# Patient Record
Sex: MALE | Race: WHITE | NOT HISPANIC OR LATINO | Employment: OTHER | URBAN - METROPOLITAN AREA
[De-identification: names, ages, dates, MRNs, and addresses within clinical notes are randomized per-mention and may not be internally consistent; named-entity substitution may affect disease eponyms.]

---

## 2017-01-06 RX ORDER — TERAZOSIN 5 MG/1
CAPSULE ORAL
Qty: 180 CAPSULE | Refills: 1 | Status: SHIPPED | OUTPATIENT
Start: 2017-01-06 | End: 2017-05-27 | Stop reason: SDUPTHER

## 2017-01-17 RX ORDER — ALPRAZOLAM 0.5 MG/1
TABLET ORAL
Qty: 180 TABLET | Refills: 0 | Status: SHIPPED | OUTPATIENT
Start: 2017-01-17 | End: 2017-04-21 | Stop reason: SDUPTHER

## 2017-02-13 RX ORDER — SIMVASTATIN 80 MG
TABLET ORAL
Qty: 90 TABLET | Refills: 3 | Status: SHIPPED | OUTPATIENT
Start: 2017-02-13 | End: 2017-07-26 | Stop reason: SDUPTHER

## 2017-03-02 ENCOUNTER — TELEPHONE (OUTPATIENT)
Dept: FAMILY MEDICINE CLINIC | Facility: CLINIC | Age: 67
End: 2017-03-02

## 2017-03-02 RX ORDER — HYDROCODONE BITARTRATE AND ACETAMINOPHEN 10; 325 MG/1; MG/1
1 TABLET ORAL EVERY 4 HOURS PRN
Qty: 540 TABLET | Refills: 0 | Status: SHIPPED | OUTPATIENT
Start: 2017-03-02 | End: 2017-06-12 | Stop reason: SDUPTHER

## 2017-03-02 NOTE — TELEPHONE ENCOUNTER
RX IS UP FRONT AND READY TO BE PICKED UP. PT IS AWARE.     ----- Message from Ellis Carroll sent at 3/1/2017  3:04 PM EST -----  PT NEEDS SCRIPT REFILL FOR HYDROcodone-acetaminophen  MG TAKE 1 TABLET EVERY 4 HOURS AS NEEDED #027.    PLEASE CONTACT PT WHEN READY FOR  [phone]

## 2017-03-28 ENCOUNTER — TELEPHONE (OUTPATIENT)
Dept: FAMILY MEDICINE CLINIC | Facility: CLINIC | Age: 67
End: 2017-03-28

## 2017-03-28 RX ORDER — GABAPENTIN 800 MG/1
800 TABLET ORAL 4 TIMES DAILY
Qty: 360 TABLET | Refills: 0 | Status: SHIPPED | OUTPATIENT
Start: 2017-03-28 | End: 2017-04-12 | Stop reason: SDUPTHER

## 2017-03-28 NOTE — TELEPHONE ENCOUNTER
SENT RX TO [facility] FOR PT. FLO.     ----- Message from Clemencia Arevalo sent at 3/28/2017 12:17 PM EDT -----  NEEDS A REFILL  ON GABAPENTIN 800 MG  90 DAY SUPPLY     PHARMACY     [facility]     PLEASE CALL PT WITH ANY QUESTIONS     [phone]

## 2017-03-31 DIAGNOSIS — E78.5 HYPERLIPIDEMIA, UNSPECIFIED HYPERLIPIDEMIA TYPE: ICD-10-CM

## 2017-03-31 DIAGNOSIS — Z79.4 TYPE 2 DIABETES MELLITUS WITH COMPLICATION, WITH LONG-TERM CURRENT USE OF INSULIN (HCC): ICD-10-CM

## 2017-03-31 DIAGNOSIS — R79.89 ELEVATED SERUM FREE T4 LEVEL: Primary | ICD-10-CM

## 2017-03-31 DIAGNOSIS — Z11.59 NEED FOR HEPATITIS C SCREENING TEST: ICD-10-CM

## 2017-03-31 DIAGNOSIS — E11.8 TYPE 2 DIABETES MELLITUS WITH COMPLICATION, WITH LONG-TERM CURRENT USE OF INSULIN (HCC): ICD-10-CM

## 2017-04-03 ENCOUNTER — TELEPHONE (OUTPATIENT)
Dept: FAMILY MEDICINE CLINIC | Facility: CLINIC | Age: 67
End: 2017-04-03

## 2017-04-03 RX ORDER — DESOXIMETASONE 0.5 MG/G
CREAM TOPICAL 2 TIMES DAILY
Qty: 60 G | Refills: 1 | Status: SHIPPED | OUTPATIENT
Start: 2017-04-03 | End: 2017-07-05

## 2017-04-03 NOTE — TELEPHONE ENCOUNTER
SENT RX TO PHARMACY FOR PT.       ----- Message from Ellis Carroll sent at 4/3/2017 10:37 AM EDT -----  PT NEEDS SCRIPT REFILL FOR desoximetasone 0.25 % cream Apply  topically 2 (two) times a day #60g.  PLEASE SEND SCRIPT TO [facility] IN [place] ON [facility].  IF YOU HAVE ANY QUESTIONS PLEASE CONTACT PT [phone] OR ON HIS CELL [phone]

## 2017-04-09 LAB
ALBUMIN SERPL-MCNC: 4.3 G/DL (ref 3.5–5.2)
ALBUMIN/GLOB SERPL: 1.7 G/DL
ALP SERPL-CCNC: 81 U/L (ref 39–117)
ALT SERPL-CCNC: 19 U/L (ref 1–41)
AST SERPL-CCNC: 15 U/L (ref 1–40)
BASOPHILS # BLD AUTO: 0.02 10*3/MM3 (ref 0–0.2)
BASOPHILS NFR BLD AUTO: 0.3 % (ref 0–1.5)
BILIRUB SERPL-MCNC: 0.2 MG/DL (ref 0.1–1.2)
BUN SERPL-MCNC: 23 MG/DL (ref 8–23)
BUN/CREAT SERPL: 12.3 (ref 7–25)
CALCIUM SERPL-MCNC: 9.6 MG/DL (ref 8.6–10.5)
CHLORIDE SERPL-SCNC: 103 MMOL/L (ref 98–107)
CHOLEST SERPL-MCNC: 168 MG/DL (ref 100–199)
CO2 SERPL-SCNC: 25.1 MMOL/L (ref 22–29)
CREAT SERPL-MCNC: 1.87 MG/DL (ref 0.76–1.27)
EOSINOPHIL # BLD AUTO: 0.25 10*3/MM3 (ref 0–0.7)
EOSINOPHIL NFR BLD AUTO: 3.8 % (ref 0.3–6.2)
ERYTHROCYTE [DISTWIDTH] IN BLOOD BY AUTOMATED COUNT: 12.7 % (ref 11.5–14.5)
GLOBULIN SER CALC-MCNC: 2.5 GM/DL
GLUCOSE SERPL-MCNC: 105 MG/DL (ref 65–99)
HBA1C MFR BLD: 6.84 % (ref 4.8–5.6)
HCT VFR BLD AUTO: 40.5 % (ref 40.4–52.2)
HCV AB S/CO SERPL IA: <0.1 S/CO RATIO (ref 0–0.9)
HCV AB SERPL QL IA: NORMAL
HDL SERPL-SCNC: 38.4 UMOL/L
HDLC SERPL-MCNC: 64 MG/DL
HGB BLD-MCNC: 13.5 G/DL (ref 13.7–17.6)
IMM GRANULOCYTES # BLD: 0.03 10*3/MM3 (ref 0–0.03)
IMM GRANULOCYTES NFR BLD: 0.5 % (ref 0–0.5)
LDL SERPL QN: 21.1 NM
LDL SERPL-SCNC: 1124 NMOL/L
LDL SMALL SERPL-SCNC: 474 NMOL/L
LDLC SERPL CALC-MCNC: 83 MG/DL (ref 0–99)
LYMPHOCYTES # BLD AUTO: 1.39 10*3/MM3 (ref 0.9–4.8)
LYMPHOCYTES NFR BLD AUTO: 21 % (ref 19.6–45.3)
MCH RBC QN AUTO: 32.2 PG (ref 27–32.7)
MCHC RBC AUTO-ENTMCNC: 33.3 G/DL (ref 32.6–36.4)
MCV RBC AUTO: 96.7 FL (ref 79.8–96.2)
MICROALBUMIN UR-MCNC: 79 UG/ML
MONOCYTES # BLD AUTO: 0.91 10*3/MM3 (ref 0.2–1.2)
MONOCYTES NFR BLD AUTO: 13.7 % (ref 5–12)
NEUTROPHILS # BLD AUTO: 4.02 10*3/MM3 (ref 1.9–8.1)
NEUTROPHILS NFR BLD AUTO: 60.7 % (ref 42.7–76)
PLATELET # BLD AUTO: 220 10*3/MM3 (ref 140–500)
POTASSIUM SERPL-SCNC: 4.3 MMOL/L (ref 3.5–5.2)
PROT SERPL-MCNC: 6.8 G/DL (ref 6–8.5)
RBC # BLD AUTO: 4.19 10*6/MM3 (ref 4.6–6)
SODIUM SERPL-SCNC: 145 MMOL/L (ref 136–145)
T4 FREE SERPL-MCNC: 0.77 NG/DL (ref 0.93–1.7)
TRIGL SERPL-MCNC: 103 MG/DL (ref 0–149)
TSH SERPL DL<=0.005 MIU/L-ACNC: 1.81 MIU/ML (ref 0.27–4.2)
WBC # BLD AUTO: 6.62 10*3/MM3 (ref 4.5–10.7)

## 2017-04-12 RX ORDER — GABAPENTIN 800 MG/1
800 TABLET ORAL 4 TIMES DAILY
Qty: 360 TABLET | Refills: 0 | Status: SHIPPED | OUTPATIENT
Start: 2017-04-12 | End: 2017-07-31 | Stop reason: SDUPTHER

## 2017-04-21 RX ORDER — ALPRAZOLAM 0.5 MG/1
TABLET ORAL
Qty: 180 TABLET | Refills: 0 | Status: SHIPPED | OUTPATIENT
Start: 2017-04-21 | End: 2017-07-26 | Stop reason: SDUPTHER

## 2017-04-21 RX ORDER — INSULIN GLARGINE 100 [IU]/ML
INJECTION, SOLUTION SUBCUTANEOUS
Qty: 32 ML | Refills: 0 | Status: SHIPPED | OUTPATIENT
Start: 2017-04-21 | End: 2017-04-27 | Stop reason: SDUPTHER

## 2017-04-27 ENCOUNTER — OFFICE VISIT (OUTPATIENT)
Dept: ENDOCRINOLOGY | Age: 67
End: 2017-04-27

## 2017-04-27 VITALS
SYSTOLIC BLOOD PRESSURE: 144 MMHG | OXYGEN SATURATION: 94 % | WEIGHT: 243.12 LBS | HEIGHT: 74 IN | DIASTOLIC BLOOD PRESSURE: 56 MMHG | BODY MASS INDEX: 31.2 KG/M2 | HEART RATE: 69 BPM

## 2017-04-27 DIAGNOSIS — I10 ESSENTIAL HYPERTENSION: ICD-10-CM

## 2017-04-27 DIAGNOSIS — E11.8 TYPE 2 DIABETES MELLITUS WITH COMPLICATION, WITH LONG-TERM CURRENT USE OF INSULIN (HCC): Primary | ICD-10-CM

## 2017-04-27 DIAGNOSIS — Z79.4 TYPE 2 DIABETES MELLITUS WITH COMPLICATION, WITH LONG-TERM CURRENT USE OF INSULIN (HCC): Primary | ICD-10-CM

## 2017-04-27 DIAGNOSIS — E11.42 DIABETIC PERIPHERAL NEUROPATHY (HCC): ICD-10-CM

## 2017-04-27 DIAGNOSIS — Q87.89 BIRT-HOGG-DUBE SYNDROME: ICD-10-CM

## 2017-04-27 DIAGNOSIS — E78.5 HYPERLIPIDEMIA, UNSPECIFIED HYPERLIPIDEMIA TYPE: ICD-10-CM

## 2017-04-27 DIAGNOSIS — N13.9 LOWER OBSTRUCTIVE UROPATHY: ICD-10-CM

## 2017-04-27 PROCEDURE — 99214 OFFICE O/P EST MOD 30 MIN: CPT | Performed by: INTERNAL MEDICINE

## 2017-04-27 RX ORDER — INSULIN GLARGINE 100 [IU]/ML
INJECTION, SOLUTION SUBCUTANEOUS
Qty: 3 EACH | Refills: 2 | Status: SHIPPED | OUTPATIENT
Start: 2017-04-27 | End: 2017-06-21 | Stop reason: SDUPTHER

## 2017-04-27 NOTE — PROGRESS NOTES
Subjective   Apolinar Tavarez is a 67 y.o. male.     HPI Comments: F/u for dm2,hypertension,hyperlipidemia / testing bs 5-6 x day / last dm eye exam 9/1/16/ last dm foot exam today with dr Skinner     Diabetes   Hypoglycemia symptoms include nervousness/anxiousness ( anxiety ). Associated symptoms include chest pain.   Hypertension   Associated symptoms include chest pain, palpitations and shortness of breath.   Hyperlipidemia   Associated symptoms include chest pain and shortness of breath.      Patient has known diabetes mellitus since 1988 and started on insulin 1996. He has been on Lantus 35 units every evening and NovoLog 20-22 units with each meal. He has gained 4 pounds since Sept 2016.  Fasting blood sugar runs between 28211.  RBS . Hemoglobin A1c done in 4/17 was 6.84%.      He has peripheral neuropathy with numbness and tingling in his feet. He has been on gabapentin 800 mg 4 times a day with partial relief. She has never used Cymbalta or Lyrica in the past. He has diabetic retinopathy and had previous retinal laser surgery and intravitreal injection on both eyes. 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 He gets intraocular injections intermittently. His last eye examination was 1/17. He has chronic kidney disease and follows with Dr. Steele. He has microalbuminuria on urine sample taken last 12/16.      He has hypertension and has been on amlodipine 5 mg once a day, terazosin 5 mg BID and Lasix 20 mg/day. He denies any previous history of heart attack or stroke. He denies chest pain.      He has hyperlipidemia and has been on Zocor 80 mg once a day for at least 5 years. Lipid profile done in 4/17 are as follows:  Cholesterol 168.  HDL 38.  LDL 83.      He had a left partial nephrectomy for cancer by Dr. Carye last April 2016. There were no postoperative complications. He did not require chemotherapy or radiation therapy. He has history of radiation therapy after he had a right orchiectomy for testicular  "cancer. He developed urethral stricture but has not had dilation. He has history of enlarged prostate and has been on terazosin and Proscar 5 mg/day. He has urinary hesitancy and dribbling. He denies dysuria.      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 He has no new lung cysts. He is on Symbicort.    The following portions of the patient's history were reviewed and updated as appropriate: allergies, current medications, past family history, past medical history, past social history, past surgical history and problem list.    Review of Systems   HENT: Negative.    Eyes: Positive for visual disturbance.   Respiratory: Positive for chest tightness and shortness of breath.    Cardiovascular: Positive for chest pain, palpitations and leg swelling.   Gastrointestinal: Negative.    Endocrine: Negative.    Genitourinary: Positive for difficulty urinating ( slow stream ) and urgency.   Musculoskeletal: Positive for back pain.   Skin: Negative.    Allergic/Immunologic: Negative.    Neurological: Positive for numbness. Light-headedness: legs and feet    Hematological: Negative.    Psychiatric/Behavioral: Positive for sleep disturbance ( sleep apnea on c pap machine ). The patient is nervous/anxious ( anxiety ).        Objective      Vitals:    04/27/17 1359   BP: 144/56   BP Location: Right arm   Patient Position: Sitting   Cuff Size: Large Adult   Pulse: 69   SpO2: 94%   Weight: 243 lb 1.9 oz (110 kg)   Height: 74\" (188 cm)     Physical Exam   Constitutional: He is oriented to person, place, and time. He appears well-developed and well-nourished. No distress.   HENT:   Head: Normocephalic.   Nose: Nose normal.   Mouth/Throat: No oropharyngeal exudate.   Eyes: Conjunctivae and EOM are normal. Right eye exhibits no discharge. Left eye exhibits no discharge. No scleral icterus.   Neck: Normal range of motion. Neck supple. No JVD present. No tracheal deviation present. No thyromegaly present. "   Cardiovascular: Normal rate, regular rhythm, normal heart sounds and intact distal pulses.  Exam reveals no gallop and no friction rub.    No murmur heard.  Pulmonary/Chest: Effort normal and breath sounds normal. No respiratory distress. He has no wheezes. He has no rales.   Abdominal: Soft. Bowel sounds are normal. He exhibits no distension and no mass. There is no tenderness.   Musculoskeletal: Normal range of motion. He exhibits no edema or deformity.   No plantar ulcers.  Calluses on  both big toes, plantar surface   Lymphadenopathy:     He has no cervical adenopathy.   Neurological: He is alert and oriented to person, place, and time. He has normal reflexes. He displays normal reflexes. Coordination normal.   Intact light touch and vibration on both upper and lower extremities.   Skin: Skin is warm. No rash noted. No erythema. No pallor.   Psychiatric: He has a normal mood and affect. His behavior is normal. Thought content normal.     Orders Only on 03/31/2017   Component Date Value Ref Range Status   • WBC 04/07/2017 6.62  4.50 - 10.70 10*3/mm3 Final   • RBC 04/07/2017 4.19* 4.60 - 6.00 10*6/mm3 Final   • Hemoglobin 04/07/2017 13.5* 13.7 - 17.6 g/dL Final   • Hematocrit 04/07/2017 40.5  40.4 - 52.2 % Final   • MCV 04/07/2017 96.7* 79.8 - 96.2 fL Final   • MCH 04/07/2017 32.2  27.0 - 32.7 pg Final   • MCHC 04/07/2017 33.3  32.6 - 36.4 g/dL Final   • RDW 04/07/2017 12.7  11.5 - 14.5 % Final   • Platelets 04/07/2017 220  140 - 500 10*3/mm3 Final   • Neutrophil Rel % 04/07/2017 60.7  42.7 - 76.0 % Final   • Lymphocyte Rel % 04/07/2017 21.0  19.6 - 45.3 % Final   • Monocyte Rel % 04/07/2017 13.7* 5.0 - 12.0 % Final   • Eosinophil Rel % 04/07/2017 3.8  0.3 - 6.2 % Final   • Basophil Rel % 04/07/2017 0.3  0.0 - 1.5 % Final   • Neutrophils Absolute 04/07/2017 4.02  1.90 - 8.10 10*3/mm3 Final   • Lymphocytes Absolute 04/07/2017 1.39  0.90 - 4.80 10*3/mm3 Final   • Monocytes Absolute 04/07/2017 0.91  0.20 - 1.20  10*3/mm3 Final   • Eosinophils Absolute 04/07/2017 0.25  0.00 - 0.70 10*3/mm3 Final   • Basophils Absolute 04/07/2017 0.02  0.00 - 0.20 10*3/mm3 Final   • Immature Granulocyte Rel % 04/07/2017 0.5  0.0 - 0.5 % Final   • Immature Grans Absolute 04/07/2017 0.03  0.00 - 0.03 10*3/mm3 Final   • Glucose 04/07/2017 105* 65 - 99 mg/dL Final   • BUN 04/07/2017 23  8 - 23 mg/dL Final   • Creatinine 04/07/2017 1.87* 0.76 - 1.27 mg/dL Final   • eGFR Non African Am 04/07/2017 36* >60 mL/min/1.73 Final   • eGFR African Am 04/07/2017 44* >60 mL/min/1.73 Final   • BUN/Creatinine Ratio 04/07/2017 12.3  7.0 - 25.0 Final   • Sodium 04/07/2017 145  136 - 145 mmol/L Final   • Potassium 04/07/2017 4.3  3.5 - 5.2 mmol/L Final   • Chloride 04/07/2017 103  98 - 107 mmol/L Final   • Total CO2 04/07/2017 25.1  22.0 - 29.0 mmol/L Final   • Calcium 04/07/2017 9.6  8.6 - 10.5 mg/dL Final   • Total Protein 04/07/2017 6.8  6.0 - 8.5 g/dL Final   • Albumin 04/07/2017 4.30  3.50 - 5.20 g/dL Final   • Globulin 04/07/2017 2.5  gm/dL Final   • A/G Ratio 04/07/2017 1.7  g/dL Final   • Total Bilirubin 04/07/2017 0.2  0.1 - 1.2 mg/dL Final   • Alkaline Phosphatase 04/07/2017 81  39 - 117 U/L Final   • AST (SGOT) 04/07/2017 15  1 - 40 U/L Final   • ALT (SGPT) 04/07/2017 19  1 - 41 U/L Final   • LDL-P 04/07/2017 1124* <1000 nmol/L Final    Comment:                           Low                   < 1000                            Moderate         1000 - 1299                            Borderline-High  1300 - 1599                            High             1600 - 2000                            Very High             > 2000     • LDL-C 04/07/2017 83  0 - 99 mg/dL Final    Comment:                           Optimal               <  100                            Above optimal     100 -  129                            Borderline        130 -  159                            High              160 -  189                            Very high             >   189  LDL-C is inaccurate if patient is non-fasting.     • HDL-C 04/07/2017 64  >39 mg/dL Final   • Triglycerides 04/07/2017 103  0 - 149 mg/dL Final   • Total Cholesterol 04/07/2017 168  100 - 199 mg/dL Final   • HDL-P (Total) 04/07/2017 38.4  >=30.5 umol/L Final   • Small LDL-P 04/07/2017 474  <=527 nmol/L Final   • LDL Size 04/07/2017 21.1  >20.5 nm Final    Comment:  ----------------------------------------------------------                   ** INTERPRETATIVE INFORMATION**                   PARTICLE CONCENTRATION AND SIZE                      <--Lower CVD Risk   Higher CVD Risk-->    LDL AND HDL PARTICLES   Percentile in Reference Population    HDL-P (total)        High     75th    50th    25th   Low                         >34.9    34.9    30.5    26.7   <26.7    Small LDL-P          Low      25th    50th    75th   High                         <117     117     527     839    >839    LDL Size   <-Large (Pattern A)->    <-Small (Pattern B)->                      23.0    20.6           20.5      19.0   ----------------------------------------------------------  Small LDL-P and LDL Size are associated with CVD risk, but not after  LDL-P is taken into account.  These assays were developed and their performance characteristics  determined by [employer]. These assays have not been cleared by the  [place] Food and Drug Administration. The clinical utility o                           f these  laboratory values have not been fully established.     • Hemoglobin A1C 04/07/2017 6.84* 4.80 - 5.60 % Final    Comment: Hemoglobin A1C Ranges:  Increased Risk for Diabetes  5.7% to 6.4%  Diabetes                     >= 6.5%  Diabetic Goal                < 7.0%     • Microalbumin, Urine 04/07/2017 79.0  Not Estab. ug/mL Final   • Hepatitis C Ab 04/07/2017 <0.1  0.0 - 0.9 s/co ratio Final   • TSH 04/07/2017 1.810  0.270 - 4.200 mIU/mL Final   • Free T4 04/07/2017 0.77* 0.93 - 1.70 ng/dL Final   • Interpretation 04/07/2017 Comment    Final    Comment: Negative  Not infected with HCV, unless recent infection is suspected or other  evidence exists to indicate HCV infection.       Assessment/Plan   Apolinar was seen today for diabetes, hypertension and hyperlipidemia.    Diagnoses and all orders for this visit:    Type 2 diabetes mellitus with complication, with long-term current use of insulin    Hyperlipidemia, unspecified hyperlipidemia type    Essential hypertension    Wyod-Albu-Tqbg syndrome    Diabetic peripheral neuropathy    Lower obstructive uropathy      Decrease Lantus to 33 units every evening.  Continue Novolog at mealtime  Continue gabapentin per Dr. Awad.  Patient advised to discuss with Dr. Awad with regards to Cymbalta and Lyrica.  Continue Zocor.  Continue amlodipine, terazosin, and Lasix.  followup with Dr. Fletcher.  Followup with Dr. Carey.  Continue terazosin and Proscar.    RTC 4 mos.    Send copy of my notes and labs to Dr. Awad, Dr. Fletcher, and Dr. Carey

## 2017-04-28 ENCOUNTER — OFFICE VISIT (OUTPATIENT)
Dept: FAMILY MEDICINE CLINIC | Facility: CLINIC | Age: 67
End: 2017-04-28

## 2017-04-28 VITALS
OXYGEN SATURATION: 95 % | HEART RATE: 67 BPM | WEIGHT: 242.2 LBS | HEIGHT: 74 IN | DIASTOLIC BLOOD PRESSURE: 82 MMHG | SYSTOLIC BLOOD PRESSURE: 126 MMHG | TEMPERATURE: 98.6 F | BODY MASS INDEX: 31.08 KG/M2

## 2017-04-28 DIAGNOSIS — N18.30 CHRONIC KIDNEY DISEASE, STAGE III (MODERATE) (HCC): ICD-10-CM

## 2017-04-28 DIAGNOSIS — Z79.4 TYPE 2 DIABETES MELLITUS WITH COMPLICATION, WITH LONG-TERM CURRENT USE OF INSULIN (HCC): ICD-10-CM

## 2017-04-28 DIAGNOSIS — E78.5 HYPERLIPIDEMIA, UNSPECIFIED HYPERLIPIDEMIA TYPE: Primary | ICD-10-CM

## 2017-04-28 DIAGNOSIS — E11.42 DIABETIC PERIPHERAL NEUROPATHY (HCC): ICD-10-CM

## 2017-04-28 DIAGNOSIS — I10 ESSENTIAL HYPERTENSION: ICD-10-CM

## 2017-04-28 DIAGNOSIS — E11.8 TYPE 2 DIABETES MELLITUS WITH COMPLICATION, WITH LONG-TERM CURRENT USE OF INSULIN (HCC): ICD-10-CM

## 2017-04-28 DIAGNOSIS — E03.9 ACQUIRED HYPOTHYROIDISM: ICD-10-CM

## 2017-04-28 DIAGNOSIS — F41.9 ANXIETY: ICD-10-CM

## 2017-04-28 PROCEDURE — 99214 OFFICE O/P EST MOD 30 MIN: CPT | Performed by: INTERNAL MEDICINE

## 2017-04-28 RX ORDER — LEVOTHYROXINE SODIUM 0.05 MG/1
50 TABLET ORAL DAILY
Qty: 30 TABLET | Refills: 3 | Status: SHIPPED | OUTPATIENT
Start: 2017-04-28 | End: 2017-07-26 | Stop reason: SDUPTHER

## 2017-04-28 RX ORDER — LEVOTHYROXINE SODIUM 0.05 MG/1
50 TABLET ORAL DAILY
Qty: 30 TABLET | Refills: 3 | Status: SHIPPED | OUTPATIENT
Start: 2017-04-28 | End: 2017-04-28 | Stop reason: SDUPTHER

## 2017-04-28 NOTE — PROGRESS NOTES
Subjective   Apolinar Tavarez is a 67 y.o. male. Patient is here today for follow-up on his hypertension, hyperlipidemia, diabetes mellitus, fatigue, hypothyroidism, chronic kidney disease.  He is been stable but has general fatigue.  He does see the kidney doctors next month.  On no new medications and is had no chest pain, shortness of breath, edema or significant myalgias that are new..    Chief Complaint   Patient presents with   • Hypertension   • Hyperlipidemia          Vitals:    04/28/17 1302   BP: 126/82   Pulse: 67   Temp: 98.6 °F (37 °C)   SpO2: 95%     The following portions of the patient's history were reviewed and updated as appropriate: allergies, current medications, past family history, past medical history, past social history, past surgical history and problem list.    Past Medical History:   Diagnosis Date   • Basal cell carcinoma    • Rswn-Fvyn-Lejn syndrome     Dr. Laurent   • Cancer of kidney    • GERD (gastroesophageal reflux disease)    • Hamartoma    • Hyperlipidemia    • Restrictive lung disease     Dr. Laurent   • Testicular cancer 1978    Status post left orchiectomy followed by radiation therapy   • Type 2 diabetes mellitus    • Urethral stricture     Post radiation therapy for testicular cancer      Allergies   Allergen Reactions   • Amoxicillin Nausea And Vomiting   • Clindamycin/Lincomycin Nausea And Vomiting   • Demerol [Meperidine] Hallucinations   • Dilaudid [Hydromorphone Hcl] Nausea And Vomiting      Social History     Social History   • Marital status:      Spouse name: N/A   • Number of children: N/A   • Years of education: N/A     Occupational History   • Not on file.     Social History Main Topics   • Smoking status: Never Smoker   • Smokeless tobacco: Not on file   • Alcohol use No   • Drug use: Not on file   • Sexual activity: Not on file     Other Topics Concern   • Not on file     Social History Narrative        Current Outpatient Prescriptions:   •  ALPRAZolam  (XANAX) 0.5 MG tablet, TAKE 1 TABLET TWICE DAILY AS NEEDED FOR ANXIETY, Disp: 180 tablet, Rfl: 0  •  amLODIPine (NORVASC) 5 MG tablet, Take 5 mg by mouth daily., Disp: , Rfl:   •  B Complex Vitamins (B COMPLEX PO), Take 1 tablet/day by mouth., Disp: , Rfl:   •  Blood Glucose Monitoring Suppl (ONE TOUCH ULTRA 2) W/DEVICE kit, Testing bs 1 x day, Disp: 90 each, Rfl: 2  •  Blood Glucose Monitoring Suppl (ONE TOUCH ULTRA 2) W/DEVICE kit, TESTING BS 6 X DAY  DX CODE E11.8, Disp: 1 each, Rfl: 0  •  desoximetasone (TOPICORT) 0.05 % cream, Apply  topically 2 (Two) Times a Day., Disp: 60 g, Rfl: 1  •  DULoxetine (CYMBALTA) 30 MG capsule, Take 1 capsule by mouth Daily., Disp: 90 capsule, Rfl: 2  •  finasteride (PROSCAR) 5 MG tablet, Take 1 tablet by mouth Daily., Disp: 90 tablet, Rfl: 2  •  fluticasone (FLONASE) 50 MCG/ACT nasal spray, 2 sprays into each nostril daily., Disp: , Rfl:   •  furosemide (LASIX) 40 MG tablet, Take 20 mg by mouth daily., Disp: , Rfl:   •  gabapentin (NEURONTIN) 800 MG tablet, Take 1 tablet by mouth 4 (Four) Times a Day., Disp: 360 tablet, Rfl: 0  •  glucagon (GLUCAGEN) 1 MG injection, Glucagon Emergency 1 MG Injection Kit; Patient Sig: Glucagon Emergency 1 MG Injection Kit USE AS DIRECTED.; 1; 3; 09-Apr-2015; Active, Disp: , Rfl:   •  glucose blood (ONE TOUCH ULTRA TEST) test strip, TESTING BS 6  X DAY  DX CODE  E11.8, Disp: 600 each, Rfl: 0  •  HYDROcodone-acetaminophen (NORCO)  MG per tablet, Take 1 tablet by mouth Every 4 (Four) Hours As Needed for moderate pain (4-6)., Disp: 540 tablet, Rfl: 0  •  insulin glargine (LANTUS) 100 UNIT/ML injection, 33 units every evening., Disp: 3 each, Rfl: 2  •  Multiple Vitamins-Minerals (CENTRUM SILVER PO), Take 1 tablet/day by mouth., Disp: , Rfl:   •  NOVOLOG 100 UNIT/ML injection, 20 UNITS 5-6 TIMES DAILY with meals, Disp: 110 mL, Rfl: 2  •  omeprazole (PriLOSEC) 20 MG capsule, TAKE 1 CAPSULE EVERY DAY, Disp: 90 capsule, Rfl: 3  •  simvastatin (ZOCOR)  80 MG tablet, TAKE 1 TABLET EVERY DAY, Disp: 90 tablet, Rfl: 3  •  SYMBICORT 160-4.5 MCG/ACT inhaler, Inhale 2 puffs 2 (two) times a day., Disp: , Rfl:   •  terazosin (HYTRIN) 5 MG capsule, TAKE 1 CAPSULE TWICE DAILY, Disp: 180 capsule, Rfl: 1  •  Vit C-Cholecalciferol-Delores Hip 500-1000-20 MG-UNIT-MG capsule, Take 1,000 tablet/day by mouth., Disp: , Rfl:   •  vitamin D (ERGOCALCIFEROL) 47858 UNITS capsule capsule, Take 50,000 Units by mouth every 7 days., Disp: , Rfl:   •  levothyroxine (SYNTHROID) 50 MCG tablet, Take 1 tablet by mouth Daily., Disp: 30 tablet, Rfl: 3     Objective     History of Present Illness     Review of Systems   Constitutional: Positive for fatigue.   HENT: Negative.    Eyes: Negative.    Respiratory: Negative.    Cardiovascular: Negative.    Gastrointestinal: Negative.    Genitourinary: Negative.    Musculoskeletal: Positive for back pain.   Skin: Negative.    Neurological: Negative.    Psychiatric/Behavioral: Negative.        Physical Exam   Constitutional: He appears well-developed and well-nourished.   Pleasant, cooperative and in no distress with a blood pressure 130/80   HENT:   Head: Normocephalic and atraumatic.   Eyes: Conjunctivae are normal.   Neck: Normal range of motion. Neck supple.   Cardiovascular: Normal rate, regular rhythm and normal heart sounds.    Pulmonary/Chest: Effort normal and breath sounds normal. No respiratory distress. He has no wheezes. He has no rales.   Musculoskeletal: Normal range of motion.   Neurological: He is alert.   Skin: Skin is warm and dry.   Psychiatric: He has a normal mood and affect. His behavior is normal.   Nursing note and vitals reviewed.      ASSESSMENT  the patient generally seems stable with a blood pressure that's under reasonable control.  CBC is essentially normal.  CMP is a sugar that's high at 105 but stable and an elevated but stable creatinine of 1.87 and otherwise is okay.  Urine microalbumin is somewhat elevated but stable.   Hemoglobin A1c is improved to 6.84.  TSH was normal but free T4 was low again.  Lipid panel is stable and generally acceptable with a total cholesterol 168 HDL 64 LDL 83 and particle number in the moderate range and stable.  Hepatitis C screen was negative.     Problem List Items Addressed This Visit        Cardiovascular and Mediastinum    Hyperlipidemia - Primary    Essential hypertension       Endocrine    Type 2 diabetes mellitus with complication, with long-term current use of insulin    Diabetic peripheral neuropathy    Hypothyroidism    Relevant Medications    levothyroxine (SYNTHROID) 50 MCG tablet       Genitourinary    Chronic kidney disease, stage III (moderate)       Other    Anxiety          PLAN  The patient will continue current medications and I'm going to try him on some levothyroxin 50 µg daily and see if that helps with his fatigue.  I plan on rechecking him in 3 months with a CBC, CMP, NMR lipid panel, hemoglobin A1c and TSH and free T4      There are no Patient Instructions on file for this visit.  Return in about 3 months (around 7/28/2017) for with labs.

## 2017-05-30 RX ORDER — TERAZOSIN 5 MG/1
CAPSULE ORAL
Qty: 180 CAPSULE | Refills: 1 | Status: SHIPPED | OUTPATIENT
Start: 2017-05-30 | End: 2017-10-13 | Stop reason: SDUPTHER

## 2017-06-13 RX ORDER — HYDROCODONE BITARTRATE AND ACETAMINOPHEN 10; 325 MG/1; MG/1
1 TABLET ORAL EVERY 4 HOURS PRN
Qty: 540 TABLET | Refills: 0 | Status: SHIPPED | OUTPATIENT
Start: 2017-06-13 | End: 2017-09-07 | Stop reason: SDUPTHER

## 2017-06-21 DIAGNOSIS — Z79.4 TYPE 2 DIABETES MELLITUS WITH COMPLICATION, WITH LONG-TERM CURRENT USE OF INSULIN (HCC): ICD-10-CM

## 2017-06-21 DIAGNOSIS — N13.9 LOWER OBSTRUCTIVE UROPATHY: ICD-10-CM

## 2017-06-21 DIAGNOSIS — E11.8 TYPE 2 DIABETES MELLITUS WITH COMPLICATION, WITH LONG-TERM CURRENT USE OF INSULIN (HCC): ICD-10-CM

## 2017-06-22 RX ORDER — OMEPRAZOLE 20 MG/1
CAPSULE, DELAYED RELEASE ORAL
Qty: 90 CAPSULE | Refills: 3 | Status: SHIPPED | OUTPATIENT
Start: 2017-06-22 | End: 2018-04-25 | Stop reason: SDUPTHER

## 2017-06-23 RX ORDER — DULOXETIN HYDROCHLORIDE 30 MG/1
CAPSULE, DELAYED RELEASE ORAL
Qty: 90 CAPSULE | Refills: 5 | Status: SHIPPED | OUTPATIENT
Start: 2017-06-23 | End: 2018-09-18 | Stop reason: SDUPTHER

## 2017-06-23 RX ORDER — INSULIN GLARGINE 100 [IU]/ML
INJECTION, SOLUTION SUBCUTANEOUS
Qty: 30 ML | Refills: 0 | Status: SHIPPED | OUTPATIENT
Start: 2017-06-23 | End: 2017-08-23 | Stop reason: SDUPTHER

## 2017-06-23 RX ORDER — FINASTERIDE 5 MG/1
TABLET, FILM COATED ORAL
Qty: 90 TABLET | Refills: 5 | Status: SHIPPED | OUTPATIENT
Start: 2017-06-23 | End: 2018-09-18 | Stop reason: SDUPTHER

## 2017-07-05 ENCOUNTER — OFFICE VISIT (OUTPATIENT)
Dept: FAMILY MEDICINE CLINIC | Facility: CLINIC | Age: 67
End: 2017-07-05

## 2017-07-05 VITALS
DIASTOLIC BLOOD PRESSURE: 70 MMHG | WEIGHT: 247.4 LBS | SYSTOLIC BLOOD PRESSURE: 140 MMHG | OXYGEN SATURATION: 93 % | HEART RATE: 73 BPM | BODY MASS INDEX: 31.75 KG/M2 | TEMPERATURE: 97.9 F | HEIGHT: 74 IN

## 2017-07-05 DIAGNOSIS — B35.6 TINEA CRURIS: ICD-10-CM

## 2017-07-05 DIAGNOSIS — I10 ESSENTIAL HYPERTENSION: Primary | ICD-10-CM

## 2017-07-05 PROCEDURE — 99213 OFFICE O/P EST LOW 20 MIN: CPT | Performed by: INTERNAL MEDICINE

## 2017-07-05 RX ORDER — CLOTRIMAZOLE AND BETAMETHASONE DIPROPIONATE 10; .64 MG/G; MG/G
CREAM TOPICAL 2 TIMES DAILY
Qty: 45 G | Refills: 2 | Status: SHIPPED | OUTPATIENT
Start: 2017-07-05 | End: 2017-07-05 | Stop reason: SDUPTHER

## 2017-07-05 RX ORDER — CLOTRIMAZOLE AND BETAMETHASONE DIPROPIONATE 10; .64 MG/G; MG/G
CREAM TOPICAL 2 TIMES DAILY
Qty: 45 G | Refills: 2 | Status: SHIPPED | OUTPATIENT
Start: 2017-07-05 | End: 2017-09-15 | Stop reason: SDUPTHER

## 2017-07-05 NOTE — PROGRESS NOTES
Subjective   Apolinar Tavarez is a 67 y.o. male. Patient is here today for follow-up on his hypertension.  Primarily the he has a pruritic rash in the groin area for months.  He's been using a cortisone cream on it but is not taking care of it..    Chief Complaint   Patient presents with   • Rash     IN GROIN AREA- PT SAID HE HAD THIS LAST YEAR AND WAS GIVEN SOME CREAM AND IT WENT AWAY AND CAME BACK           Vitals:    07/05/17 1301   BP: 140/70   Pulse: 73   Temp: 97.9 °F (36.6 °C)   SpO2: 93%     The following portions of the patient's history were reviewed and updated as appropriate: allergies, current medications, past family history, past medical history, past social history, past surgical history and problem list.    Past Medical History:   Diagnosis Date   • Basal cell carcinoma    • Vnbj-Kwbe-Ewef syndrome     Dr. Laurent   • Cancer of kidney    • GERD (gastroesophageal reflux disease)    • Hamartoma    • Hyperlipidemia    • Restrictive lung disease     Dr. Laurent   • Testicular cancer 1978    Status post left orchiectomy followed by radiation therapy   • Type 2 diabetes mellitus    • Urethral stricture     Post radiation therapy for testicular cancer      Allergies   Allergen Reactions   • Amoxicillin Nausea And Vomiting   • Clindamycin/Lincomycin Nausea And Vomiting   • Demerol [Meperidine] Hallucinations   • Dilaudid [Hydromorphone Hcl] Nausea And Vomiting      Social History     Social History   • Marital status:      Spouse name: N/A   • Number of children: N/A   • Years of education: N/A     Occupational History   • Not on file.     Social History Main Topics   • Smoking status: Never Smoker   • Smokeless tobacco: Not on file   • Alcohol use No   • Drug use: Not on file   • Sexual activity: Not on file     Other Topics Concern   • Not on file     Social History Narrative        Current Outpatient Prescriptions:   •  ALPRAZolam (XANAX) 0.5 MG tablet, TAKE 1 TABLET TWICE DAILY AS NEEDED FOR  ANXIETY, Disp: 180 tablet, Rfl: 0  •  amLODIPine (NORVASC) 5 MG tablet, Take 5 mg by mouth daily., Disp: , Rfl:   •  B Complex Vitamins (B COMPLEX PO), Take 1 tablet/day by mouth., Disp: , Rfl:   •  Blood Glucose Monitoring Suppl (ONE TOUCH ULTRA 2) W/DEVICE kit, TESTING BS 6 X DAY  DX CODE E11.8, Disp: 1 each, Rfl: 0  •  DULoxetine (CYMBALTA) 30 MG capsule, TAKE 1 CAPSULE EVERY DAY, Disp: 90 capsule, Rfl: 5  •  finasteride (PROSCAR) 5 MG tablet, TAKE 1 TABLET EVERY DAY, Disp: 90 tablet, Rfl: 5  •  fluticasone (FLONASE) 50 MCG/ACT nasal spray, 2 sprays into each nostril daily., Disp: , Rfl:   •  furosemide (LASIX) 40 MG tablet, Take 20 mg by mouth daily., Disp: , Rfl:   •  gabapentin (NEURONTIN) 800 MG tablet, Take 1 tablet by mouth 4 (Four) Times a Day., Disp: 360 tablet, Rfl: 0  •  glucagon (GLUCAGEN) 1 MG injection, Glucagon Emergency 1 MG Injection Kit; Patient Sig: Glucagon Emergency 1 MG Injection Kit USE AS DIRECTED.; 1; 3; 09-Apr-2015; Active, Disp: , Rfl:   •  glucose blood (ONE TOUCH ULTRA TEST) test strip, TESTING BS 6  X DAY  DX CODE  E11.8, Disp: 600 each, Rfl: 0  •  HYDROcodone-acetaminophen (NORCO)  MG per tablet, Take 1 tablet by mouth Every 4 (Four) Hours As Needed for Moderate Pain (4-6)., Disp: 540 tablet, Rfl: 0  •  LANTUS 100 UNIT/ML injection, INJECT 35 UNITS  SUBCUTANEOUSLY  NIGHTLY (DISCARD AND BEGIN A NEW VIAL EVERY 28 DAYS), Disp: 30 mL, Rfl: 0  •  levothyroxine (SYNTHROID) 50 MCG tablet, Take 1 tablet by mouth Daily., Disp: 30 tablet, Rfl: 3  •  Multiple Vitamins-Minerals (CENTRUM SILVER PO), Take 1 tablet/day by mouth., Disp: , Rfl:   •  NOVOLOG 100 UNIT/ML injection, 20 UNITS 5-6 TIMES DAILY with meals, Disp: 110 mL, Rfl: 1  •  omeprazole (priLOSEC) 20 MG capsule, TAKE 1 CAPSULE EVERY DAY, Disp: 90 capsule, Rfl: 3  •  simvastatin (ZOCOR) 80 MG tablet, TAKE 1 TABLET EVERY DAY, Disp: 90 tablet, Rfl: 3  •  SYMBICORT 160-4.5 MCG/ACT inhaler, Inhale 2 puffs 2 (two) times a day., Disp: ,  Rfl:   •  terazosin (HYTRIN) 5 MG capsule, TAKE 1 CAPSULE TWICE DAILY, Disp: 180 capsule, Rfl: 1  •  Vit C-Cholecalciferol-Delores Hip 500-1000-20 MG-UNIT-MG capsule, Take 1,000 tablet/day by mouth., Disp: , Rfl:   •  vitamin D (ERGOCALCIFEROL) 25654 UNITS capsule capsule, Take 50,000 Units by mouth every 7 days., Disp: , Rfl:   •  clotrimazole-betamethasone (LOTRISONE) 1-0.05 % cream, Apply  topically 2 (Two) Times a Day., Disp: 45 g, Rfl: 2     Objective     History of Present Illness     Review of Systems   Constitutional: Negative.    HENT: Negative.    Eyes: Negative.    Respiratory: Negative.    Cardiovascular: Negative.    Gastrointestinal: Negative.    Genitourinary: Negative.    Musculoskeletal: Negative.    Skin: Positive for rash.   Neurological: Negative.    Psychiatric/Behavioral: Negative.        Physical Exam   Constitutional: He appears well-developed and well-nourished.   Pleasant, cooperative and in no distress with a blood pressure of 120/70   HENT:   Head: Normocephalic and atraumatic.   Eyes: Conjunctivae are normal.   Cardiovascular: Normal rate, regular rhythm and normal heart sounds.    Pulmonary/Chest: Effort normal and breath sounds normal. No respiratory distress. He has no wheezes. He has no rales.   Neurological: He is alert.   Skin: Skin is warm. No rash noted.   The patient has a typical appearing rash of tinea cruris in the groin and also has what appears to be a large seborrheic keratosis that's slightly inflamed   Psychiatric: He has a normal mood and affect. His behavior is normal.   Nursing note and vitals reviewed.      ASSESSMENT  patient's blood pressures well controlled and he appears to have a significant tinea cruris.     Problem List Items Addressed This Visit        Cardiovascular and Mediastinum    Essential hypertension - Primary       Musculoskeletal and Integument    Tinea cruris    Relevant Medications    clotrimazole-betamethasone (LOTRISONE) 1-0.05 % cream           PLAN  He is going to stop the desoximetasone cream and I'm starting him on Lotrisone cream twice a day.  He is already scheduled for follow-up later in the month    There are no Patient Instructions on file for this visit.  No Follow-up on file.

## 2017-07-11 DIAGNOSIS — E11.8 TYPE 2 DIABETES MELLITUS WITH COMPLICATION, WITH LONG-TERM CURRENT USE OF INSULIN (HCC): ICD-10-CM

## 2017-07-11 DIAGNOSIS — E78.5 HYPERLIPIDEMIA, UNSPECIFIED HYPERLIPIDEMIA TYPE: ICD-10-CM

## 2017-07-11 DIAGNOSIS — Z79.4 TYPE 2 DIABETES MELLITUS WITH COMPLICATION, WITH LONG-TERM CURRENT USE OF INSULIN (HCC): ICD-10-CM

## 2017-07-11 DIAGNOSIS — E03.9 ACQUIRED HYPOTHYROIDISM: ICD-10-CM

## 2017-07-18 LAB
ALBUMIN SERPL-MCNC: 4.1 G/DL (ref 3.5–5.2)
ALBUMIN/GLOB SERPL: 1.6 G/DL
ALP SERPL-CCNC: 84 U/L (ref 39–117)
ALT SERPL-CCNC: 21 U/L (ref 1–41)
AST SERPL-CCNC: 18 U/L (ref 1–40)
BASOPHILS # BLD AUTO: 0.02 10*3/MM3 (ref 0–0.2)
BASOPHILS NFR BLD AUTO: 0.2 % (ref 0–1.5)
BILIRUB SERPL-MCNC: 0.4 MG/DL (ref 0.1–1.2)
BUN SERPL-MCNC: 35 MG/DL (ref 8–23)
BUN/CREAT SERPL: 16.4 (ref 7–25)
CALCIUM SERPL-MCNC: 9.9 MG/DL (ref 8.6–10.5)
CHLORIDE SERPL-SCNC: 101 MMOL/L (ref 98–107)
CHOLEST SERPL-MCNC: 151 MG/DL (ref 100–199)
CO2 SERPL-SCNC: 30.3 MMOL/L (ref 22–29)
CREAT SERPL-MCNC: 2.14 MG/DL (ref 0.76–1.27)
EOSINOPHIL # BLD AUTO: 0.1 10*3/MM3 (ref 0–0.7)
EOSINOPHIL NFR BLD AUTO: 1.2 % (ref 0.3–6.2)
ERYTHROCYTE [DISTWIDTH] IN BLOOD BY AUTOMATED COUNT: 12.9 % (ref 11.5–14.5)
GLOBULIN SER CALC-MCNC: 2.6 GM/DL
GLUCOSE SERPL-MCNC: 71 MG/DL (ref 65–99)
HBA1C MFR BLD: 7 % (ref 4.8–5.6)
HCT VFR BLD AUTO: 39.9 % (ref 40.4–52.2)
HDL SERPL-SCNC: 39.2 UMOL/L
HDLC SERPL-MCNC: 69 MG/DL
HGB BLD-MCNC: 12.7 G/DL (ref 13.7–17.6)
IMM GRANULOCYTES # BLD: 0.09 10*3/MM3 (ref 0–0.03)
IMM GRANULOCYTES NFR BLD: 1 % (ref 0–0.5)
LDL SERPL QN: 20.7 NM
LDL SERPL-SCNC: 885 NMOL/L
LDL SMALL SERPL-SCNC: 278 NMOL/L
LDLC SERPL CALC-MCNC: 73 MG/DL (ref 0–99)
LYMPHOCYTES # BLD AUTO: 1.54 10*3/MM3 (ref 0.9–4.8)
LYMPHOCYTES NFR BLD AUTO: 17.9 % (ref 19.6–45.3)
MCH RBC QN AUTO: 32.2 PG (ref 27–32.7)
MCHC RBC AUTO-ENTMCNC: 31.8 G/DL (ref 32.6–36.4)
MCV RBC AUTO: 101.3 FL (ref 79.8–96.2)
MONOCYTES # BLD AUTO: 1.12 10*3/MM3 (ref 0.2–1.2)
MONOCYTES NFR BLD AUTO: 13 % (ref 5–12)
NEUTROPHILS # BLD AUTO: 5.72 10*3/MM3 (ref 1.9–8.1)
NEUTROPHILS NFR BLD AUTO: 66.7 % (ref 42.7–76)
PLATELET # BLD AUTO: 247 10*3/MM3 (ref 140–500)
POTASSIUM SERPL-SCNC: 4.6 MMOL/L (ref 3.5–5.2)
PROT SERPL-MCNC: 6.7 G/DL (ref 6–8.5)
RBC # BLD AUTO: 3.94 10*6/MM3 (ref 4.6–6)
SODIUM SERPL-SCNC: 145 MMOL/L (ref 136–145)
T4 FREE SERPL-MCNC: 1.03 NG/DL (ref 0.93–1.7)
TRIGL SERPL-MCNC: 46 MG/DL (ref 0–149)
TSH SERPL DL<=0.005 MIU/L-ACNC: 0.64 MIU/ML (ref 0.27–4.2)
WBC # BLD AUTO: 8.59 10*3/MM3 (ref 4.5–10.7)

## 2017-07-26 ENCOUNTER — OFFICE VISIT (OUTPATIENT)
Dept: FAMILY MEDICINE CLINIC | Facility: CLINIC | Age: 67
End: 2017-07-26

## 2017-07-26 VITALS
TEMPERATURE: 98.5 F | DIASTOLIC BLOOD PRESSURE: 70 MMHG | OXYGEN SATURATION: 91 % | HEART RATE: 89 BPM | BODY MASS INDEX: 31.6 KG/M2 | WEIGHT: 246.2 LBS | SYSTOLIC BLOOD PRESSURE: 134 MMHG | HEIGHT: 74 IN

## 2017-07-26 DIAGNOSIS — E78.5 HYPERLIPIDEMIA, UNSPECIFIED HYPERLIPIDEMIA TYPE: Primary | ICD-10-CM

## 2017-07-26 DIAGNOSIS — E11.8 TYPE 2 DIABETES MELLITUS WITH COMPLICATION, WITH LONG-TERM CURRENT USE OF INSULIN (HCC): ICD-10-CM

## 2017-07-26 DIAGNOSIS — G47.00 INSOMNIA, UNSPECIFIED TYPE: ICD-10-CM

## 2017-07-26 DIAGNOSIS — Z79.4 TYPE 2 DIABETES MELLITUS WITH COMPLICATION, WITH LONG-TERM CURRENT USE OF INSULIN (HCC): ICD-10-CM

## 2017-07-26 DIAGNOSIS — N18.30 CHRONIC KIDNEY DISEASE, STAGE III (MODERATE) (HCC): ICD-10-CM

## 2017-07-26 DIAGNOSIS — I10 ESSENTIAL HYPERTENSION: ICD-10-CM

## 2017-07-26 DIAGNOSIS — F41.9 ANXIETY: ICD-10-CM

## 2017-07-26 DIAGNOSIS — B35.6 TINEA CRURIS: ICD-10-CM

## 2017-07-26 DIAGNOSIS — E03.9 ACQUIRED HYPOTHYROIDISM: ICD-10-CM

## 2017-07-26 PROCEDURE — 99214 OFFICE O/P EST MOD 30 MIN: CPT | Performed by: INTERNAL MEDICINE

## 2017-07-26 RX ORDER — SIMVASTATIN 40 MG
40 TABLET ORAL NIGHTLY
Qty: 90 TABLET | Refills: 3 | Status: SHIPPED | OUTPATIENT
Start: 2017-07-26 | End: 2018-01-16 | Stop reason: ALTCHOICE

## 2017-07-26 RX ORDER — LEVOTHYROXINE SODIUM 0.07 MG/1
75 TABLET ORAL DAILY
Qty: 30 TABLET | Refills: 5 | Status: SHIPPED | OUTPATIENT
Start: 2017-07-26 | End: 2017-11-29

## 2017-07-26 RX ORDER — ALPRAZOLAM 0.5 MG/1
0.5 TABLET ORAL 2 TIMES DAILY
Qty: 180 TABLET | Refills: 0 | Status: SHIPPED | OUTPATIENT
Start: 2017-07-26 | End: 2017-11-29 | Stop reason: SDUPTHER

## 2017-07-26 NOTE — PROGRESS NOTES
Subjective   Apolinar Tavarez is a 67 y.o. male. Patient is here today for follow-up on his anxiety and insomnia, diabetes mellitus, chronic kidney disease, hyperlipidemia and hypertension and recent tinea cruris.  He generally is stable but is having breathing problems today because of the heat and humidity.  He also has general fatigue.  Otherwise he is generally been stable.  His tinea cruris is much improved on the Lotrisone    Chief Complaint   Patient presents with   • Diabetes     HLD, HYPOTHYROIDISM- FOLLOW UP LABS   • Back Pain     REFILL ON  HYDROCODONE   • Anxiety     REFILL ON ALPRAZOLAM           Vitals:    07/26/17 1311   BP: 134/70   Pulse: 89   Temp: 98.5 °F (36.9 °C)   SpO2: 91%     The following portions of the patient's history were reviewed and updated as appropriate: allergies, current medications, past family history, past medical history, past social history, past surgical history and problem list.    Past Medical History:   Diagnosis Date   • Basal cell carcinoma    • Qsax-Uuzh-Ssaf syndrome     Dr. Laurent   • Cancer of kidney    • GERD (gastroesophageal reflux disease)    • Hamartoma    • Hyperlipidemia    • Restrictive lung disease     Dr. Laurent   • Testicular cancer 1978    Status post left orchiectomy followed by radiation therapy   • Type 2 diabetes mellitus    • Urethral stricture     Post radiation therapy for testicular cancer      Allergies   Allergen Reactions   • Amoxicillin Nausea And Vomiting   • Clindamycin/Lincomycin Nausea And Vomiting   • Demerol [Meperidine] Hallucinations   • Dilaudid [Hydromorphone Hcl] Nausea And Vomiting      Social History     Social History   • Marital status:      Spouse name: N/A   • Number of children: N/A   • Years of education: N/A     Occupational History   • Not on file.     Social History Main Topics   • Smoking status: Never Smoker   • Smokeless tobacco: Not on file   • Alcohol use No   • Drug use: Not on file   • Sexual activity: Not  on file     Other Topics Concern   • Not on file     Social History Narrative        Current Outpatient Prescriptions:   •  ALPRAZolam (XANAX) 0.5 MG tablet, Take 1 tablet by mouth 2 (Two) Times a Day., Disp: 180 tablet, Rfl: 0  •  amLODIPine (NORVASC) 5 MG tablet, Take 5 mg by mouth daily., Disp: , Rfl:   •  B Complex Vitamins (B COMPLEX PO), Take 1 tablet/day by mouth., Disp: , Rfl:   •  Blood Glucose Monitoring Suppl (ONE TOUCH ULTRA 2) W/DEVICE kit, TESTING BS 6 X DAY  DX CODE E11.8, Disp: 1 each, Rfl: 0  •  clotrimazole-betamethasone (LOTRISONE) 1-0.05 % cream, Apply  topically 2 (Two) Times a Day., Disp: 45 g, Rfl: 2  •  DULoxetine (CYMBALTA) 30 MG capsule, TAKE 1 CAPSULE EVERY DAY, Disp: 90 capsule, Rfl: 5  •  finasteride (PROSCAR) 5 MG tablet, TAKE 1 TABLET EVERY DAY, Disp: 90 tablet, Rfl: 5  •  fluticasone (FLONASE) 50 MCG/ACT nasal spray, 2 sprays into each nostril daily., Disp: , Rfl:   •  furosemide (LASIX) 40 MG tablet, Take 20 mg by mouth daily., Disp: , Rfl:   •  gabapentin (NEURONTIN) 800 MG tablet, Take 1 tablet by mouth 4 (Four) Times a Day., Disp: 360 tablet, Rfl: 0  •  glucagon (GLUCAGEN) 1 MG injection, Glucagon Emergency 1 MG Injection Kit; Patient Sig: Glucagon Emergency 1 MG Injection Kit USE AS DIRECTED.; 1; 3; 09-Apr-2015; Active, Disp: , Rfl:   •  glucose blood (ONE TOUCH ULTRA TEST) test strip, TESTING BS 6  X DAY  DX CODE  E11.8, Disp: 600 each, Rfl: 0  •  HYDROcodone-acetaminophen (NORCO)  MG per tablet, Take 1 tablet by mouth Every 4 (Four) Hours As Needed for Moderate Pain (4-6)., Disp: 540 tablet, Rfl: 0  •  LANTUS 100 UNIT/ML injection, INJECT 35 UNITS  SUBCUTANEOUSLY  NIGHTLY (DISCARD AND BEGIN A NEW VIAL EVERY 28 DAYS), Disp: 30 mL, Rfl: 0  •  levothyroxine (SYNTHROID, LEVOTHROID) 75 MCG tablet, Take 1 tablet by mouth Daily., Disp: 30 tablet, Rfl: 5  •  Multiple Vitamins-Minerals (CENTRUM SILVER PO), Take 1 tablet/day by mouth., Disp: , Rfl:   •  NOVOLOG 100 UNIT/ML  injection, 20 UNITS 5-6 TIMES DAILY with meals, Disp: 110 mL, Rfl: 1  •  omeprazole (priLOSEC) 20 MG capsule, TAKE 1 CAPSULE EVERY DAY, Disp: 90 capsule, Rfl: 3  •  simvastatin (ZOCOR) 40 MG tablet, Take 1 tablet by mouth Every Night., Disp: 90 tablet, Rfl: 3  •  SYMBICORT 160-4.5 MCG/ACT inhaler, Inhale 2 puffs 2 (two) times a day., Disp: , Rfl:   •  terazosin (HYTRIN) 5 MG capsule, TAKE 1 CAPSULE TWICE DAILY, Disp: 180 capsule, Rfl: 1  •  Vit C-Cholecalciferol-Delores Hip 500-1000-20 MG-UNIT-MG capsule, Take 1,000 tablet/day by mouth., Disp: , Rfl:   •  vitamin D (ERGOCALCIFEROL) 04484 UNITS capsule capsule, Take 50,000 Units by mouth every 7 days., Disp: , Rfl:      Objective     History of Present Illness     Review of Systems   Constitutional: Positive for fatigue.   HENT: Negative.    Eyes: Negative.    Respiratory: Positive for shortness of breath.    Cardiovascular: Positive for leg swelling.   Gastrointestinal: Negative.    Genitourinary: Negative.    Musculoskeletal: Positive for back pain.   Skin: Negative.    Neurological: Negative.    Psychiatric/Behavioral: Negative.        Physical Exam   Constitutional: He appears well-developed and well-nourished.   Pleasant, cooperative and in no respiratory distress sitting with a blood pressure 130/70   HENT:   Head: Normocephalic and atraumatic.   Eyes: Conjunctivae are normal.   Neck: Normal range of motion. Neck supple. No thyromegaly present.   Cardiovascular: Normal rate, regular rhythm and normal heart sounds.    Pulmonary/Chest: Effort normal. No respiratory distress. He has no wheezes. He has no rales.   Breath sounds decreased generally   Musculoskeletal: Normal range of motion. He exhibits no edema.   Neurological: He is alert.   Skin: Skin is warm and dry.   Psychiatric: He has a normal mood and affect. His behavior is normal.   Nursing note and vitals reviewed.      ASSESSMENT  overall the patient generally seems stable.  Blood pressures under pretty  good control and heart and lungs sound generally stable and fine.  His CBC has a minimally low RBC hemoglobin and hematocrit that stable.  CMP had a sugar of 71 and a creatinine high at 2.14 and other values essentially normal.  His lipid panel is under excellent control.  Hemoglobin A1c has increased to 7.0.  TSH is normal at 0.636 and free T4 is normal but on the low side at 1.03.  Patient does have general fatigue.  His Glen was reviewed and is appropriate.     Problem List Items Addressed This Visit     None          PLAN  I refilled the patient's alprazolam.  I'm going to try him on a bit more thyroid, 75 µg daily of levothyroxine and see if that helps with his fatigue.  I'm going to cut his simvastatin in half to 40 mg.  I would like to recheck him in 3 months with a CBC, CMP, NMR lipid panel, hemoglobin A1c, urine microalbumin and TSH and free T4    There are no Patient Instructions on file for this visit.  Return in about 3 months (around 10/26/2017) for with labs.

## 2017-08-01 RX ORDER — GABAPENTIN 800 MG/1
TABLET ORAL
Qty: 360 TABLET | Refills: 0 | Status: SHIPPED | OUTPATIENT
Start: 2017-08-01 | End: 2017-12-07 | Stop reason: SDUPTHER

## 2017-08-23 DIAGNOSIS — E11.8 TYPE 2 DIABETES MELLITUS WITH COMPLICATION, WITH LONG-TERM CURRENT USE OF INSULIN (HCC): ICD-10-CM

## 2017-08-23 DIAGNOSIS — Z79.4 TYPE 2 DIABETES MELLITUS WITH COMPLICATION, WITH LONG-TERM CURRENT USE OF INSULIN (HCC): ICD-10-CM

## 2017-08-23 RX ORDER — INSULIN GLARGINE 100 [IU]/ML
INJECTION, SOLUTION SUBCUTANEOUS
Qty: 34 ML | Refills: 1 | Status: SHIPPED | OUTPATIENT
Start: 2017-08-23 | End: 2017-12-07 | Stop reason: SDUPTHER

## 2017-09-05 ENCOUNTER — OFFICE VISIT (OUTPATIENT)
Dept: ENDOCRINOLOGY | Age: 67
End: 2017-09-05

## 2017-09-05 VITALS
SYSTOLIC BLOOD PRESSURE: 148 MMHG | HEIGHT: 74 IN | HEART RATE: 74 BPM | WEIGHT: 247.2 LBS | BODY MASS INDEX: 31.73 KG/M2 | OXYGEN SATURATION: 94 % | DIASTOLIC BLOOD PRESSURE: 56 MMHG

## 2017-09-05 DIAGNOSIS — I10 ESSENTIAL HYPERTENSION: ICD-10-CM

## 2017-09-05 DIAGNOSIS — E78.5 HYPERLIPIDEMIA, UNSPECIFIED HYPERLIPIDEMIA TYPE: ICD-10-CM

## 2017-09-05 DIAGNOSIS — E03.9 ACQUIRED HYPOTHYROIDISM: ICD-10-CM

## 2017-09-05 DIAGNOSIS — K21.9 GASTROESOPHAGEAL REFLUX DISEASE, ESOPHAGITIS PRESENCE NOT SPECIFIED: ICD-10-CM

## 2017-09-05 DIAGNOSIS — Z79.4 TYPE 2 DIABETES MELLITUS WITH COMPLICATION, WITH LONG-TERM CURRENT USE OF INSULIN (HCC): Primary | ICD-10-CM

## 2017-09-05 DIAGNOSIS — E11.319 DIABETIC RETINOPATHY ASSOCIATED WITH TYPE 2 DIABETES MELLITUS, MACULAR EDEMA PRESENCE UNSPECIFIED, UNSPECIFIED RETINOPATHY SEVERITY: ICD-10-CM

## 2017-09-05 DIAGNOSIS — E11.8 TYPE 2 DIABETES MELLITUS WITH COMPLICATION, WITH LONG-TERM CURRENT USE OF INSULIN (HCC): Primary | ICD-10-CM

## 2017-09-05 DIAGNOSIS — Q87.89 BIRT-HOGG-DUBE SYNDROME: ICD-10-CM

## 2017-09-05 PROCEDURE — 99214 OFFICE O/P EST MOD 30 MIN: CPT | Performed by: INTERNAL MEDICINE

## 2017-09-05 NOTE — PROGRESS NOTES
Subjective   Apolinar Tavarez is a 67 y.o. male.     HPI Comments: F/u for dm 2,hypertension, hyperlipidemia, sleep apnea  / testing bs 5-6 x day / last dm eye exam jan2017/ last dm foot exam 4/27/17 with dr Skinner    Diabetes   Associated symptoms include fatigue.   Hyperlipidemia   Associated symptoms include shortness of breath.   Hypertension   Associated symptoms include shortness of breath.      Patient has known diabetes mellitus since 1988 and started on insulin 1996. He has been on Lantus 35 units every evening and NovoLog 20-22 units with each meal. He has gained 4 pounds since Sept 2016.  Fasting blood sugar runs between .  RBS . Hemoglobin A1c done in 7/17 was 7.0%.      He has peripheral neuropathy with numbness and tingling in his feet. He has been on gabapentin 800 mg 4 times a day with partial relief. She has never used Cymbalta or Lyrica in the past. He has diabetic retinopathy and had previous retinal laser surgery and intravitreal injection on both eyes. 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 He gets intraocular injections intermittently and the last one was 5 weeks ago. He has chronic kidney disease and follows with Dr. Steele. He has microalbuminuria on urine sample taken last 12/16.      He has hypertension and has been on amlodipine 5 mg once a day, terazosin 5 mg BID and Lasix 20 mg/day. He denies any previous history of heart attack or stroke. He denies chest pain.      He has hyperlipidemia and has been on Zocor 40 mg once a day for at least 5 years. Lipid profile done in 7/17 are as follows:  Cholesterol 151.  HDL 39.  LDL 69.    He has hypothyroidism and is on levothyroxine 75 mcg/day.      He had a left partial nephrectomy for cancer by Dr. Carey last April 2016. There were no postoperative complications. He did not require chemotherapy or radiation therapy. He has history of radiation therapy after he had a right orchiectomy for testicular cancer. He developed urethral  "stricture but has not had dilation. He has history of enlarged prostate and has been on terazosin and Proscar 5 mg/day. He has urinary hesitancy and dribbling. He denies dysuria.       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    Review of Systems   Constitutional: Positive for fatigue.   HENT: Negative.    Eyes: Negative.    Respiratory: Positive for shortness of breath and wheezing.    Cardiovascular: Negative.    Gastrointestinal: Negative.    Endocrine: Negative.    Genitourinary: Positive for frequency.   Musculoskeletal: Positive for back pain.   Skin: Negative.    Allergic/Immunologic: Negative.    Neurological: Negative.    Hematological: Bruises/bleeds easily.   Psychiatric/Behavioral: Positive for sleep disturbance. Self-injury:  sleep apnea on c pap machine        Objective      Vitals:    09/05/17 1417   BP: 148/56   BP Location: Right arm   Patient Position: Sitting   Cuff Size: Large Adult   Pulse: 74   SpO2: 94%   Weight: 247 lb 3.2 oz (112 kg)   Height: 74\" (188 cm)     Physical Exam   Constitutional: He is oriented to person, place, and time. He appears well-developed and well-nourished. No distress.   HENT:   Head: Normocephalic.   Nose: Nose normal.   Mouth/Throat: No oropharyngeal exudate.   Eyes: Conjunctivae and EOM are normal. Right eye exhibits no discharge. Left eye exhibits no discharge. No scleral icterus.   Neck: Normal range of motion. No JVD present. No tracheal deviation present. No thyromegaly present.   Cardiovascular: Normal rate, regular rhythm and normal heart sounds.  Exam reveals no friction rub.    No murmur heard.  Pulmonary/Chest: Effort normal and breath sounds normal. No respiratory distress. He has no wheezes. He " has no rales.   Abdominal: Soft. Bowel sounds are normal. He exhibits no distension and no mass. There is no tenderness.   Musculoskeletal: Normal range of motion. He exhibits no edema or deformity.   Neurological: He is alert and oriented to person, place, and time. He has normal reflexes. He displays normal reflexes. Coordination normal.   Intact light touch   Skin: Skin is warm and dry. No rash noted. No erythema.   Psychiatric: He has a normal mood and affect. His behavior is normal.     Orders Only on 07/11/2017   Component Date Value Ref Range Status   • Glucose 07/17/2017 71  65 - 99 mg/dL Final   • BUN 07/17/2017 35* 8 - 23 mg/dL Final   • Creatinine 07/17/2017 2.14* 0.76 - 1.27 mg/dL Final   • eGFR Non African Am 07/17/2017 31* >60 mL/min/1.73 Final   • eGFR African Am 07/17/2017 37* >60 mL/min/1.73 Final   • BUN/Creatinine Ratio 07/17/2017 16.4  7.0 - 25.0 Final   • Sodium 07/17/2017 145  136 - 145 mmol/L Final   • Potassium 07/17/2017 4.6  3.5 - 5.2 mmol/L Final   • Chloride 07/17/2017 101  98 - 107 mmol/L Final   • Total CO2 07/17/2017 30.3* 22.0 - 29.0 mmol/L Final   • Calcium 07/17/2017 9.9  8.6 - 10.5 mg/dL Final   • Total Protein 07/17/2017 6.7  6.0 - 8.5 g/dL Final   • Albumin 07/17/2017 4.10  3.50 - 5.20 g/dL Final   • Globulin 07/17/2017 2.6  gm/dL Final   • A/G Ratio 07/17/2017 1.6  g/dL Final   • Total Bilirubin 07/17/2017 0.4  0.1 - 1.2 mg/dL Final   • Alkaline Phosphatase 07/17/2017 84  39 - 117 U/L Final   • AST (SGOT) 07/17/2017 18  1 - 40 U/L Final   • ALT (SGPT) 07/17/2017 21  1 - 41 U/L Final   • LDL-P 07/17/2017 885  <1000 nmol/L Final    Comment:                           Low                   < 1000                            Moderate         1000 - 1299                            Borderline-High  1300 - 1599                            High             1600 - 2000                            Very High             > 2000     • LDL-C 07/17/2017 73  0 - 99 mg/dL Final    Comment:                            Optimal               <  100                            Above optimal     100 -  129                            Borderline        130 -  159                            High              160 -  189                            Very high             >  189  LDL-C is inaccurate if patient is non-fasting.     • HDL-C 07/17/2017 69  >39 mg/dL Final   • Triglycerides 07/17/2017 46  0 - 149 mg/dL Final   • Total Cholesterol 07/17/2017 151  100 - 199 mg/dL Final   • HDL-P (Total) 07/17/2017 39.2  >=30.5 umol/L Final   • Small LDL-P 07/17/2017 278  <=527 nmol/L Final   • LDL Size 07/17/2017 20.7  >20.5 nm Final    Comment:  ----------------------------------------------------------                   ** INTERPRETATIVE INFORMATION**                   PARTICLE CONCENTRATION AND SIZE                      <--Lower CVD Risk   Higher CVD Risk-->    LDL AND HDL PARTICLES   Percentile in Reference Population    HDL-P (total)        High     75th    50th    25th   Low                         >34.9    34.9    30.5    26.7   <26.7    Small LDL-P          Low      25th    50th    75th   High                         <117     117     527     839    >839    LDL Size   <-Large (Pattern A)->    <-Small (Pattern B)->                      23.0    20.6           20.5      19.0   ----------------------------------------------------------  Small LDL-P and LDL Size are associated with CVD risk, but not after  LDL-P is taken into account.  These assays were developed and their performance characteristics  determined by [employer]. These assays have not been cleared by the  [place] Food and Drug Administration. The clinical utility o                           f these  laboratory values have not been fully established.     • Hemoglobin A1C 07/17/2017 7.00* 4.80 - 5.60 % Final    Comment: Hemoglobin A1C Ranges:  Increased Risk for Diabetes  5.7% to 6.4%  Diabetes                     >= 6.5%  Diabetic Goal                < 7.0%     • TSH  07/17/2017 0.636  0.270 - 4.200 mIU/mL Final   • Free T4 07/17/2017 1.03  0.93 - 1.70 ng/dL Final   • WBC 07/17/2017 8.59  4.50 - 10.70 10*3/mm3 Final   • RBC 07/17/2017 3.94* 4.60 - 6.00 10*6/mm3 Final   • Hemoglobin 07/17/2017 12.7* 13.7 - 17.6 g/dL Final   • Hematocrit 07/17/2017 39.9* 40.4 - 52.2 % Final   • MCV 07/17/2017 101.3* 79.8 - 96.2 fL Final   • MCH 07/17/2017 32.2  27.0 - 32.7 pg Final   • MCHC 07/17/2017 31.8* 32.6 - 36.4 g/dL Final   • RDW 07/17/2017 12.9  11.5 - 14.5 % Final   • Platelets 07/17/2017 247  140 - 500 10*3/mm3 Final   • Neutrophil Rel % 07/17/2017 66.7  42.7 - 76.0 % Final   • Lymphocyte Rel % 07/17/2017 17.9* 19.6 - 45.3 % Final   • Monocyte Rel % 07/17/2017 13.0* 5.0 - 12.0 % Final   • Eosinophil Rel % 07/17/2017 1.2  0.3 - 6.2 % Final   • Basophil Rel % 07/17/2017 0.2  0.0 - 1.5 % Final   • Neutrophils Absolute 07/17/2017 5.72  1.90 - 8.10 10*3/mm3 Final   • Lymphocytes Absolute 07/17/2017 1.54  0.90 - 4.80 10*3/mm3 Final   • Monocytes Absolute 07/17/2017 1.12  0.20 - 1.20 10*3/mm3 Final   • Eosinophils Absolute 07/17/2017 0.10  0.00 - 0.70 10*3/mm3 Final   • Basophils Absolute 07/17/2017 0.02  0.00 - 0.20 10*3/mm3 Final   • Immature Granulocyte Rel % 07/17/2017 1.0* 0.0 - 0.5 % Final   • Immature Grans Absolute 07/17/2017 0.09* 0.00 - 0.03 10*3/mm3 Final     Assessment/Plan   Apolinar was seen today for diabetes, hyperlipidemia and hypertension.    Diagnoses and all orders for this visit:    Type 2 diabetes mellitus with complication, with long-term current use of insulin    Acquired hypothyroidism    Hyperlipidemia, unspecified hyperlipidemia type    Essential hypertension    [id number] syndrome    Gastroesophageal reflux disease, esophagitis presence not specified    Diabetic retinopathy associated with type 2 diabetes mellitus, macular edema presence unspecified, unspecified retinopathy severity      Continue Lantus and Novolog.  Continue levothyroxine.  Decrease simvastatin to  20 mg once a day due to potential interaction with amlodipine.  Continue amlodipine, terazosin, and Lasix.  Continue omeprazole.  Followup with Dr. Carey, Dr. Garland, and Dr. Fletcher as scheduled.    RTC 4 mos.     Send copy of my notes and labs to Dr. Awad, Dr. Carey, Dr. Garland and Dr. Fletcher.

## 2017-09-07 ENCOUNTER — TELEPHONE (OUTPATIENT)
Dept: FAMILY MEDICINE CLINIC | Facility: CLINIC | Age: 67
End: 2017-09-07

## 2017-09-07 RX ORDER — HYDROCODONE BITARTRATE AND ACETAMINOPHEN 10; 325 MG/1; MG/1
1 TABLET ORAL EVERY 4 HOURS PRN
Qty: 540 TABLET | Refills: 0 | Status: SHIPPED | OUTPATIENT
Start: 2017-09-07 | End: 2017-12-15 | Stop reason: SDUPTHER

## 2017-09-07 NOTE — TELEPHONE ENCOUNTER
RX IS UP FRONT AND READY TO BE PICKED UP. PT IS AWARE.     ----- Message from Karma Mccann MA sent at 9/7/2017  1:33 PM EDT -----  Contact: PATIENT  REFILL- HYDROCODONE 10/325 #[phone]    PLEASE CALL WHEN READY [phone]

## 2017-09-15 RX ORDER — CLOTRIMAZOLE AND BETAMETHASONE DIPROPIONATE 10; .64 MG/G; MG/G
CREAM TOPICAL
Qty: 45 G | Refills: 2 | Status: SHIPPED | OUTPATIENT
Start: 2017-09-15 | End: 2017-11-28 | Stop reason: SDUPTHER

## 2017-09-20 RX ORDER — LEVOTHYROXINE SODIUM 0.05 MG/1
TABLET ORAL
Qty: 90 TABLET | Refills: 3 | OUTPATIENT
Start: 2017-09-20

## 2017-10-13 RX ORDER — LEVOTHYROXINE SODIUM 0.05 MG/1
TABLET ORAL
Qty: 90 TABLET | Refills: 3 | Status: SHIPPED | OUTPATIENT
Start: 2017-10-13 | End: 2017-11-29 | Stop reason: SDUPTHER

## 2017-10-13 RX ORDER — TERAZOSIN 5 MG/1
CAPSULE ORAL
Qty: 180 CAPSULE | Refills: 1 | Status: SHIPPED | OUTPATIENT
Start: 2017-10-13 | End: 2018-02-23 | Stop reason: SDUPTHER

## 2017-10-18 DIAGNOSIS — E78.5 HYPERLIPIDEMIA, UNSPECIFIED HYPERLIPIDEMIA TYPE: ICD-10-CM

## 2017-10-18 DIAGNOSIS — E03.9 ACQUIRED HYPOTHYROIDISM: ICD-10-CM

## 2017-10-18 DIAGNOSIS — E11.8 TYPE 2 DIABETES MELLITUS WITH COMPLICATION, WITH LONG-TERM CURRENT USE OF INSULIN (HCC): ICD-10-CM

## 2017-10-18 DIAGNOSIS — Z79.4 TYPE 2 DIABETES MELLITUS WITH COMPLICATION, WITH LONG-TERM CURRENT USE OF INSULIN (HCC): ICD-10-CM

## 2017-10-28 LAB
ALBUMIN SERPL-MCNC: 4.2 G/DL (ref 3.5–5.2)
ALBUMIN/GLOB SERPL: 1.6 G/DL
ALP SERPL-CCNC: 89 U/L (ref 39–117)
ALT SERPL-CCNC: 23 U/L (ref 1–41)
AST SERPL-CCNC: 20 U/L (ref 1–40)
BASOPHILS # BLD AUTO: 0.03 10*3/MM3 (ref 0–0.2)
BASOPHILS NFR BLD AUTO: 0.4 % (ref 0–1.5)
BILIRUB SERPL-MCNC: 0.3 MG/DL (ref 0.1–1.2)
BUN SERPL-MCNC: 40 MG/DL (ref 8–23)
BUN/CREAT SERPL: 19.3 (ref 7–25)
CALCIUM SERPL-MCNC: 9.8 MG/DL (ref 8.6–10.5)
CHLORIDE SERPL-SCNC: 99 MMOL/L (ref 98–107)
CHOLEST SERPL-MCNC: 165 MG/DL (ref 100–199)
CO2 SERPL-SCNC: 30.8 MMOL/L (ref 22–29)
CREAT SERPL-MCNC: 2.07 MG/DL (ref 0.76–1.27)
EOSINOPHIL # BLD AUTO: 0.28 10*3/MM3 (ref 0–0.7)
EOSINOPHIL NFR BLD AUTO: 3.5 % (ref 0.3–6.2)
ERYTHROCYTE [DISTWIDTH] IN BLOOD BY AUTOMATED COUNT: 12.9 % (ref 11.5–14.5)
GFR SERPLBLD CREATININE-BSD FMLA CKD-EPI: 32 ML/MIN/1.73
GFR SERPLBLD CREATININE-BSD FMLA CKD-EPI: 39 ML/MIN/1.73
GLOBULIN SER CALC-MCNC: 2.7 GM/DL
GLUCOSE SERPL-MCNC: 80 MG/DL (ref 65–99)
HBA1C MFR BLD: 6.97 % (ref 4.8–5.6)
HCT VFR BLD AUTO: 42.6 % (ref 40.4–52.2)
HDL SERPL-SCNC: 38.3 UMOL/L
HDLC SERPL-MCNC: 60 MG/DL
HGB BLD-MCNC: 13.5 G/DL (ref 13.7–17.6)
IMM GRANULOCYTES # BLD: 0.09 10*3/MM3 (ref 0–0.03)
IMM GRANULOCYTES NFR BLD: 1.1 % (ref 0–0.5)
LDL SERPL QN: 21 NM
LDL SERPL-SCNC: 1007 NMOL/L
LDL SMALL SERPL-SCNC: 469 NMOL/L
LDLC SERPL CALC-MCNC: 83 MG/DL (ref 0–99)
LYMPHOCYTES # BLD AUTO: 1.37 10*3/MM3 (ref 0.9–4.8)
LYMPHOCYTES NFR BLD AUTO: 16.9 % (ref 19.6–45.3)
MCH RBC QN AUTO: 31.8 PG (ref 27–32.7)
MCHC RBC AUTO-ENTMCNC: 31.7 G/DL (ref 32.6–36.4)
MCV RBC AUTO: 100.5 FL (ref 79.8–96.2)
MICROALBUMIN UR-MCNC: 130.6 UG/ML
MONOCYTES # BLD AUTO: 0.96 10*3/MM3 (ref 0.2–1.2)
MONOCYTES NFR BLD AUTO: 11.8 % (ref 5–12)
NEUTROPHILS # BLD AUTO: 5.38 10*3/MM3 (ref 1.9–8.1)
NEUTROPHILS NFR BLD AUTO: 66.3 % (ref 42.7–76)
PLATELET # BLD AUTO: 257 10*3/MM3 (ref 140–500)
POTASSIUM SERPL-SCNC: 4.8 MMOL/L (ref 3.5–5.2)
PROT SERPL-MCNC: 6.9 G/DL (ref 6–8.5)
RBC # BLD AUTO: 4.24 10*6/MM3 (ref 4.6–6)
SODIUM SERPL-SCNC: 145 MMOL/L (ref 136–145)
T4 FREE SERPL-MCNC: 0.64 NG/DL (ref 0.93–1.7)
TRIGL SERPL-MCNC: 110 MG/DL (ref 0–149)
TSH SERPL DL<=0.005 MIU/L-ACNC: 1.21 MIU/ML (ref 0.27–4.2)
WBC # BLD AUTO: 8.11 10*3/MM3 (ref 4.5–10.7)

## 2017-11-28 ENCOUNTER — TELEPHONE (OUTPATIENT)
Dept: FAMILY MEDICINE CLINIC | Facility: CLINIC | Age: 67
End: 2017-11-28

## 2017-11-28 NOTE — TELEPHONE ENCOUNTER
PT HAS APPT TOMORROW (11/29/2017)- HE CAN GET RX AT HIS APPT.     ----- Message from Brittney Floyd sent at 11/28/2017  1:38 PM EST -----  REFILL ON ALPRAZOLAM    PLEASE CALL PT WHEN READY [phone]

## 2017-11-29 ENCOUNTER — OFFICE VISIT (OUTPATIENT)
Dept: FAMILY MEDICINE CLINIC | Facility: CLINIC | Age: 67
End: 2017-11-29

## 2017-11-29 VITALS
WEIGHT: 244 LBS | HEIGHT: 74 IN | DIASTOLIC BLOOD PRESSURE: 70 MMHG | SYSTOLIC BLOOD PRESSURE: 134 MMHG | BODY MASS INDEX: 31.32 KG/M2 | OXYGEN SATURATION: 92 % | HEART RATE: 70 BPM | TEMPERATURE: 98 F

## 2017-11-29 DIAGNOSIS — Z79.4 TYPE 2 DIABETES MELLITUS WITH COMPLICATION, WITH LONG-TERM CURRENT USE OF INSULIN (HCC): ICD-10-CM

## 2017-11-29 DIAGNOSIS — F41.9 ANXIETY: ICD-10-CM

## 2017-11-29 DIAGNOSIS — E03.9 ACQUIRED HYPOTHYROIDISM: ICD-10-CM

## 2017-11-29 DIAGNOSIS — Q87.89 BIRT-HOGG-DUBE SYNDROME: ICD-10-CM

## 2017-11-29 DIAGNOSIS — N18.30 CHRONIC KIDNEY DISEASE, STAGE III (MODERATE) (HCC): ICD-10-CM

## 2017-11-29 DIAGNOSIS — K21.9 GASTROESOPHAGEAL REFLUX DISEASE, ESOPHAGITIS PRESENCE NOT SPECIFIED: ICD-10-CM

## 2017-11-29 DIAGNOSIS — G47.00 INSOMNIA, UNSPECIFIED TYPE: ICD-10-CM

## 2017-11-29 DIAGNOSIS — E78.5 HYPERLIPIDEMIA, UNSPECIFIED HYPERLIPIDEMIA TYPE: Primary | ICD-10-CM

## 2017-11-29 DIAGNOSIS — E11.8 TYPE 2 DIABETES MELLITUS WITH COMPLICATION, WITH LONG-TERM CURRENT USE OF INSULIN (HCC): ICD-10-CM

## 2017-11-29 DIAGNOSIS — I10 ESSENTIAL HYPERTENSION: ICD-10-CM

## 2017-11-29 PROCEDURE — 90662 IIV NO PRSV INCREASED AG IM: CPT | Performed by: INTERNAL MEDICINE

## 2017-11-29 PROCEDURE — 99214 OFFICE O/P EST MOD 30 MIN: CPT | Performed by: INTERNAL MEDICINE

## 2017-11-29 PROCEDURE — G0008 ADMIN INFLUENZA VIRUS VAC: HCPCS | Performed by: INTERNAL MEDICINE

## 2017-11-29 RX ORDER — ALPRAZOLAM 0.5 MG/1
TABLET ORAL
Qty: 180 TABLET | Refills: 0 | OUTPATIENT
Start: 2017-11-29

## 2017-11-29 RX ORDER — ALPRAZOLAM 0.5 MG/1
0.5 TABLET ORAL 2 TIMES DAILY
Qty: 180 TABLET | Refills: 0 | Status: SHIPPED | OUTPATIENT
Start: 2017-11-29 | End: 2018-03-01 | Stop reason: SDUPTHER

## 2017-11-29 RX ORDER — CLOTRIMAZOLE AND BETAMETHASONE DIPROPIONATE 10; .64 MG/G; MG/G
CREAM TOPICAL
Qty: 45 G | Refills: 2 | Status: SHIPPED | OUTPATIENT
Start: 2017-11-29 | End: 2018-02-15 | Stop reason: SDUPTHER

## 2017-11-29 RX ORDER — LEVOTHYROXINE SODIUM 0.07 MG/1
75 TABLET ORAL DAILY
Qty: 90 TABLET | Refills: 3 | Status: SHIPPED | OUTPATIENT
Start: 2017-11-29 | End: 2018-03-01 | Stop reason: DRUGHIGH

## 2017-11-29 NOTE — PROGRESS NOTES
Subjective   Apolinar Tavarez is a 67 y.o. male. Patient is here today for follow-up on his hypertension, hyperlipidemia, GERD, diabetes mellitus, hypothyroidism, chronic kidney disease and anxiety and insomnia.  He is generally been stable.  He is getting over a minor illness and is feeling better.  He's not had a flu shot yet.  He's had no particular chest pain, shortness of breath, edema or significant myalgias that are new.    Chief Complaint   Patient presents with   • Diabetes     HLD, HTN, GERD, THYROID, CKD- FOLLOW UP LABS AND MEDS   • Anxiety     NEEDS REFILL ON ALPRAZOLAM           Vitals:    11/29/17 0914   BP: 134/70   Pulse: 70   Temp: 98 °F (36.7 °C)   SpO2: 92%     The following portions of the patient's history were reviewed and updated as appropriate: allergies, current medications, past family history, past medical history, past social history, past surgical history and problem list.    Past Medical History:   Diagnosis Date   • Basal cell carcinoma    • Qabh-Qfka-Oqag syndrome     Dr. Laurent   • Cancer of kidney    • GERD (gastroesophageal reflux disease)    • Hamartoma    • Hyperlipidemia    • Restrictive lung disease     Dr. Laurent   • Testicular cancer 1978    Status post left orchiectomy followed by radiation therapy   • Type 2 diabetes mellitus    • Urethral stricture     Post radiation therapy for testicular cancer      Allergies   Allergen Reactions   • Amoxicillin Nausea And Vomiting   • Clindamycin/Lincomycin Nausea And Vomiting   • Demerol [Meperidine] Hallucinations   • Dilaudid [Hydromorphone Hcl] Nausea And Vomiting      Social History     Social History   • Marital status:      Spouse name: N/A   • Number of children: N/A   • Years of education: N/A     Occupational History   • Not on file.     Social History Main Topics   • Smoking status: Never Smoker   • Smokeless tobacco: Never Used   • Alcohol use No   • Drug use: Not on file   • Sexual activity: Not on file     Other  Topics Concern   • Not on file     Social History Narrative        Current Outpatient Prescriptions:   •  ALPRAZolam (XANAX) 0.5 MG tablet, Take 1 tablet by mouth 2 (Two) Times a Day., Disp: 180 tablet, Rfl: 0  •  amLODIPine (NORVASC) 5 MG tablet, Take 5 mg by mouth daily., Disp: , Rfl:   •  B Complex Vitamins (B COMPLEX PO), Take 1 tablet/day by mouth., Disp: , Rfl:   •  Blood Glucose Monitoring Suppl (ONE TOUCH ULTRA 2) W/DEVICE kit, TESTING BS 6 X DAY  DX CODE E11.8, Disp: 1 each, Rfl: 0  •  clotrimazole-betamethasone (LOTRISONE) 1-0.05 % cream, APPLY  TOPICALLY TWICE DAILY, Disp: 45 g, Rfl: 2  •  DULoxetine (CYMBALTA) 30 MG capsule, TAKE 1 CAPSULE EVERY DAY, Disp: 90 capsule, Rfl: 5  •  finasteride (PROSCAR) 5 MG tablet, TAKE 1 TABLET EVERY DAY, Disp: 90 tablet, Rfl: 5  •  fluticasone (FLONASE) 50 MCG/ACT nasal spray, 2 sprays into each nostril daily., Disp: , Rfl:   •  furosemide (LASIX) 40 MG tablet, Take 20 mg by mouth daily., Disp: , Rfl:   •  gabapentin (NEURONTIN) 800 MG tablet, TAKE 1 TABLET BY MOUTH 4 (FOUR) TIMES A DAY., Disp: 360 tablet, Rfl: 0  •  glucagon (GLUCAGEN) 1 MG injection, Glucagon Emergency 1 MG Injection Kit; Patient Sig: Glucagon Emergency 1 MG Injection Kit USE AS DIRECTED.; 1; 3; 09-Apr-2015; Active, Disp: , Rfl:   •  glucose blood (ONE TOUCH ULTRA TEST) test strip, TESTING BS 6  X DAY  DX CODE  E11.8, Disp: 600 each, Rfl: 0  •  HYDROcodone-acetaminophen (NORCO)  MG per tablet, Take 1 tablet by mouth Every 4 (Four) Hours As Needed for Moderate Pain ., Disp: 540 tablet, Rfl: 0  •  LANTUS 100 UNIT/ML injection, INJECT 35 UNITS SUBCUTANEOUSLY NIGHTLY (DISCARD VIAL 28 DAYS AFTER OPENING), Disp: 34 mL, Rfl: 1  •  levothyroxine (SYNTHROID, LEVOTHROID) 75 MCG tablet, Take 1 tablet by mouth Daily., Disp: 90 tablet, Rfl: 3  •  Multiple Vitamins-Minerals (CENTRUM SILVER PO), Take 1 tablet/day by mouth., Disp: , Rfl:   •  NOVOLOG 100 UNIT/ML injection, INJECT 20 UNITS SUBCUTANEOUSLY 5 TO 6  TIMES DAILY WITH MEALS, Disp: 110 mL, Rfl: 1  •  omeprazole (priLOSEC) 20 MG capsule, TAKE 1 CAPSULE EVERY DAY, Disp: 90 capsule, Rfl: 3  •  simvastatin (ZOCOR) 40 MG tablet, Take 1 tablet by mouth Every Night., Disp: 90 tablet, Rfl: 3  •  SYMBICORT 160-4.5 MCG/ACT inhaler, Inhale 2 puffs 2 (two) times a day., Disp: , Rfl:   •  terazosin (HYTRIN) 5 MG capsule, TAKE 1 CAPSULE TWICE DAILY, Disp: 180 capsule, Rfl: 1  •  Vit C-Cholecalciferol-Delores Hip 500-1000-20 MG-UNIT-MG capsule, Take 1,000 tablet/day by mouth., Disp: , Rfl:   •  vitamin D (ERGOCALCIFEROL) 70414 UNITS capsule capsule, Take 50,000 Units by mouth every 7 days., Disp: , Rfl:      Objective     History of Present Illness     Review of Systems   Constitutional: Negative.    HENT: Negative.    Eyes: Negative.    Respiratory: Negative.    Cardiovascular: Negative.    Gastrointestinal: Positive for nausea.   Endocrine: Negative.    Genitourinary: Negative.    Musculoskeletal: Positive for back pain.   Skin: Negative.    Neurological: Negative.    Psychiatric/Behavioral: Positive for sleep disturbance.       Physical Exam   Constitutional: He is oriented to person, place, and time. He appears well-developed and well-nourished.   Pleasant, cooperative no distress with a blood pressure 130/80   HENT:   Head: Normocephalic and atraumatic.   Eyes: Conjunctivae are normal. Pupils are equal, round, and reactive to light. No scleral icterus.   Neck: Normal range of motion. Neck supple. No thyromegaly present.   Cardiovascular: Normal rate, regular rhythm and normal heart sounds.    Pulmonary/Chest: Effort normal and breath sounds normal. No respiratory distress. He has no wheezes. He has no rales.   Musculoskeletal: Normal range of motion. He exhibits no edema.   Neurological: He is alert and oriented to person, place, and time.   Skin: Skin is warm and dry.   Psychiatric: He has a normal mood and affect. His behavior is normal.   Nursing note and vitals  reviewed.      ASSESSMENT  CBC is essentially normal with a slightly low RBC count of 4.24, hemoglobin 13.5 and a normal hematocrit.  CMP had an elevated creatinine of 2.07 but stable and slightly improved and otherwise no essential abnormalities.  Hemoglobin A1c is acceptable at 6.97 and stable.  Urine microalbumin was 130.  NMR lipid panel generally was fine with a total cholesterol 165, HDL of 60, LDL 83 with the particle #[id number], almost in the low range.  TSH was normal at 1.2 but free T4 is low at 0.64.   #1-hypertension, adequately controlled  #2-diabetes mellitus type 2, reasonable control  #3-hyperlipidemia, generally excellent control  #4-hypothyroidism, slightly undertreated  #5- chronic kidney disease, stable  The patient's [place] was reviewed and is appropriate.     Problem List Items Addressed This Visit        Cardiovascular and Mediastinum    Hyperlipidemia - Primary    Essential hypertension       Digestive    GERD (gastroesophageal reflux disease)       Endocrine    Type 2 diabetes mellitus with complication, with long-term current use of insulin    Acquired hypothyroidism    Relevant Medications    levothyroxine (SYNTHROID, LEVOTHROID) 75 MCG tablet       Musculoskeletal and Integument    Qwqd-Vmbl-Oyiw syndrome       Genitourinary    Chronic kidney disease, stage III (moderate)       Other    Anxiety    Insomnia          PLAN  The patient received a flu shot today.  I refilled his alprazolam.  He will continue his other medications as now but I have increased his levothyroxine to 75 µg daily.  I would like to recheck him in 3 months with a CMP, NMR lipid panel, hemoglobin A1c and TSH and free T4    There are no Patient Instructions on file for this visit.  Return in about 3 months (around 2/28/2018) for with labs.

## 2017-12-07 DIAGNOSIS — E11.8 TYPE 2 DIABETES MELLITUS WITH COMPLICATION, WITH LONG-TERM CURRENT USE OF INSULIN (HCC): ICD-10-CM

## 2017-12-07 DIAGNOSIS — Z79.4 TYPE 2 DIABETES MELLITUS WITH COMPLICATION, WITH LONG-TERM CURRENT USE OF INSULIN (HCC): ICD-10-CM

## 2017-12-07 RX ORDER — INSULIN GLARGINE 100 [IU]/ML
INJECTION, SOLUTION SUBCUTANEOUS
Qty: 32 ML | Refills: 1 | Status: SHIPPED | OUTPATIENT
Start: 2017-12-07 | End: 2019-05-06

## 2017-12-07 RX ORDER — GABAPENTIN 800 MG/1
TABLET ORAL
Qty: 360 TABLET | Refills: 1 | Status: SHIPPED | OUTPATIENT
Start: 2017-12-07

## 2017-12-15 ENCOUNTER — TELEPHONE (OUTPATIENT)
Dept: FAMILY MEDICINE CLINIC | Facility: CLINIC | Age: 67
End: 2017-12-15

## 2017-12-15 RX ORDER — HYDROCODONE BITARTRATE AND ACETAMINOPHEN 10; 325 MG/1; MG/1
1 TABLET ORAL EVERY 4 HOURS PRN
Qty: 540 TABLET | Refills: 0 | Status: SHIPPED | OUTPATIENT
Start: 2017-12-15 | End: 2018-03-01 | Stop reason: SDUPTHER

## 2018-01-08 ENCOUNTER — OFFICE VISIT (OUTPATIENT)
Dept: ENDOCRINOLOGY | Age: 68
End: 2018-01-08

## 2018-01-08 VITALS
HEART RATE: 77 BPM | SYSTOLIC BLOOD PRESSURE: 162 MMHG | OXYGEN SATURATION: 92 % | BODY MASS INDEX: 31.8 KG/M2 | DIASTOLIC BLOOD PRESSURE: 68 MMHG | HEIGHT: 74 IN | WEIGHT: 247.8 LBS

## 2018-01-08 DIAGNOSIS — E03.9 ACQUIRED HYPOTHYROIDISM: ICD-10-CM

## 2018-01-08 DIAGNOSIS — Z79.4 TYPE 2 DIABETES MELLITUS WITH COMPLICATION, WITH LONG-TERM CURRENT USE OF INSULIN (HCC): Primary | ICD-10-CM

## 2018-01-08 DIAGNOSIS — E11.42 DIABETIC PERIPHERAL NEUROPATHY (HCC): ICD-10-CM

## 2018-01-08 DIAGNOSIS — K21.9 GASTROESOPHAGEAL REFLUX DISEASE, ESOPHAGITIS PRESENCE NOT SPECIFIED: ICD-10-CM

## 2018-01-08 DIAGNOSIS — E78.5 HYPERLIPIDEMIA, UNSPECIFIED HYPERLIPIDEMIA TYPE: ICD-10-CM

## 2018-01-08 DIAGNOSIS — E11.319 DIABETIC RETINOPATHY ASSOCIATED WITH TYPE 2 DIABETES MELLITUS, MACULAR EDEMA PRESENCE UNSPECIFIED, UNSPECIFIED LATERALITY, UNSPECIFIED RETINOPATHY SEVERITY (HCC): ICD-10-CM

## 2018-01-08 DIAGNOSIS — Q87.89 BIRT-HOGG-DUBE SYNDROME: ICD-10-CM

## 2018-01-08 DIAGNOSIS — Z85.47 HISTORY OF TESTICULAR CANCER: ICD-10-CM

## 2018-01-08 DIAGNOSIS — E11.8 TYPE 2 DIABETES MELLITUS WITH COMPLICATION, WITH LONG-TERM CURRENT USE OF INSULIN (HCC): Primary | ICD-10-CM

## 2018-01-08 DIAGNOSIS — I10 ESSENTIAL HYPERTENSION: ICD-10-CM

## 2018-01-08 PROCEDURE — 99214 OFFICE O/P EST MOD 30 MIN: CPT | Performed by: INTERNAL MEDICINE

## 2018-01-08 RX ORDER — LEVOTHYROXINE SODIUM 0.05 MG/1
TABLET ORAL
COMMUNITY
Start: 2017-12-15 | End: 2018-01-08 | Stop reason: DRUGHIGH

## 2018-01-08 NOTE — PROGRESS NOTES
Subjective   Apolinar Tavarez is a 67 y.o. male.     HPI Comments: F/u for dm2,hyperlipdemia, hypertension, sleep apnea / testing bs 5-6 x day / last dm eye exam 3/7/17/ last dm foot exam today with dr Skinner / / flu vaccine @[facility]     Diabetes   Hypoglycemia symptoms include dizziness, nervousness/anxiousness and tremors. Associated symptoms include chest pain and fatigue.   Hyperlipidemia   Associated symptoms include chest pain and shortness of breath.   Hypertension   Associated symptoms include chest pain, palpitations and shortness of breath. Identifiable causes of hypertension include sleep apnea.   Sleep Apnea   Associated symptoms include chest pain, fatigue and numbness (legs and feet ).      Patient has known diabetes mellitus since 1988 and started on insulin 1996. He has been on Lantus 35 units every evening and NovoLog 20-22 units with each meal. He has no weight change since 9/17.  Fasting blood sugar runs between .  RBS .       He has peripheral neuropathy with numbness and tingling in his feet. He has been on gabapentin 800 mg 4 times a day with partial relief. She has never used Cymbalta or Lyrica in the past. He has diabetic retinopathy and had previous retinal laser surgery and intravitreal injection on both eyes. 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 He gets intraocular injections intermittently and the last one was 6 weeks ago. He has chronic kidney disease and follows with Dr. Steele. He has microalbuminuria on urine sample taken last 12/16.      He has hypertension and has been on amlodipine 5 mg once a day, terazosin 5 mg BID and Lasix 20 mg/day. He denies any previous history of heart attack or stroke. He denies chest pain.      He has hyperlipidemia and has been on Zocor 20 mg once a day for at least 5 years. He has not used Lipitor and Crestor in the past.     He has hypothyroidism and is on levothyroxine 75 mcg/day.      He had a left partial nephrectomy for cancer by Dr. Carey  "last April 2016. He did not require chemotherapy or radiation therapy.  He is currently being evaluated for possible local recurrence of the cancer.      He has history of radiation therapy after he had a right orchiectomy for testicular cancer. He developed urethral stricture but has not had dilation. He has history of enlarged prostate and has been on terazosin and Proscar 5 mg/day. He has urinary hesitancy and dribbling. He denies dysuria.       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    Review of Systems   Constitutional: Positive for fatigue.   Eyes: Positive for visual disturbance.   Respiratory: Positive for chest tightness, shortness of breath and wheezing.    Cardiovascular: Positive for chest pain and palpitations.   Gastrointestinal: Negative.    Endocrine: Negative.    Genitourinary: Positive for difficulty urinating and frequency.   Musculoskeletal: Positive for back pain.   Skin: Negative.    Allergic/Immunologic: Negative.    Neurological: Positive for dizziness, tremors and numbness (legs and feet ).   Hematological: Bruises/bleeds easily.   Psychiatric/Behavioral: Positive for sleep disturbance ( sleep apnea on c a pap machine ). The patient is nervous/anxious.        Objective      Vitals:    01/08/18 1401   BP: 162/68   BP Location: Right arm   Patient Position: Sitting   Cuff Size: Large Adult   Pulse: 77   SpO2: 92%   Weight: 112 kg (247 lb 12.8 oz)   Height: 188 cm (74.02\")     Physical Exam   Constitutional: He is oriented to person, place, and time. He appears well-developed and well-nourished. No distress.   HENT:   Head: Normocephalic.   Nose: Nose normal.   Mouth/Throat: No oropharyngeal exudate.   Eyes: Conjunctivae and EOM are " normal. Right eye exhibits no discharge. Left eye exhibits no discharge.   Neck: Neck supple. No JVD present. No tracheal deviation present. No thyromegaly present.   Cardiovascular: Normal rate, regular rhythm, normal heart sounds and intact distal pulses.  Exam reveals no gallop and no friction rub.    No murmur heard.  Pulmonary/Chest: Effort normal and breath sounds normal. No respiratory distress. He has no wheezes. He has no rales.   Abdominal: Soft. Bowel sounds are normal. He exhibits no distension and no mass. There is no tenderness.   Musculoskeletal: Normal range of motion. He exhibits no edema, tenderness or deformity.   Lymphadenopathy:     He has no cervical adenopathy.   Neurological: He is alert and oriented to person, place, and time. He has normal reflexes. He displays normal reflexes. No cranial nerve deficit. Coordination normal.   Decreased light touch in distal lower ext.  Calluses on 1st toes.   Skin: Skin is warm and dry. No rash noted. No erythema. No pallor.   Psychiatric: He has a normal mood and affect. His behavior is normal.     Orders Only on 10/18/2017   Component Date Value Ref Range Status   • WBC 10/26/2017 8.11  4.50 - 10.70 10*3/mm3 Final   • RBC 10/26/2017 4.24* 4.60 - 6.00 10*6/mm3 Final   • Hemoglobin 10/26/2017 13.5* 13.7 - 17.6 g/dL Final   • Hematocrit 10/26/2017 42.6  40.4 - 52.2 % Final   • MCV 10/26/2017 100.5* 79.8 - 96.2 fL Final   • MCH 10/26/2017 31.8  27.0 - 32.7 pg Final   • MCHC 10/26/2017 31.7* 32.6 - 36.4 g/dL Final   • RDW 10/26/2017 12.9  11.5 - 14.5 % Final   • Platelets 10/26/2017 257  140 - 500 10*3/mm3 Final   • Neutrophil Rel % 10/26/2017 66.3  42.7 - 76.0 % Final   • Lymphocyte Rel % 10/26/2017 16.9* 19.6 - 45.3 % Final   • Monocyte Rel % 10/26/2017 11.8  5.0 - 12.0 % Final   • Eosinophil Rel % 10/26/2017 3.5  0.3 - 6.2 % Final   • Basophil Rel % 10/26/2017 0.4  0.0 - 1.5 % Final   • Neutrophils Absolute 10/26/2017 5.38  1.90 - 8.10 10*3/mm3 Final   •  Lymphocytes Absolute 10/26/2017 1.37  0.90 - 4.80 10*3/mm3 Final   • Monocytes Absolute 10/26/2017 0.96  0.20 - 1.20 10*3/mm3 Final   • Eosinophils Absolute 10/26/2017 0.28  0.00 - 0.70 10*3/mm3 Final   • Basophils Absolute 10/26/2017 0.03  0.00 - 0.20 10*3/mm3 Final   • Immature Granulocyte Rel % 10/26/2017 1.1* 0.0 - 0.5 % Final   • Immature Grans Absolute 10/26/2017 0.09* 0.00 - 0.03 10*3/mm3 Final   • Glucose 10/26/2017 80  65 - 99 mg/dL Final   • BUN 10/26/2017 40* 8 - 23 mg/dL Final   • Creatinine 10/26/2017 2.07* 0.76 - 1.27 mg/dL Final   • eGFR Non  Am 10/26/2017 32* >60 mL/min/1.73 Final   • eGFR African Am 10/26/2017 39* >60 mL/min/1.73 Final   • BUN/Creatinine Ratio 10/26/2017 19.3  7.0 - 25.0 Final   • Sodium 10/26/2017 145  136 - 145 mmol/L Final   • Potassium 10/26/2017 4.8  3.5 - 5.2 mmol/L Final   • Chloride 10/26/2017 99  98 - 107 mmol/L Final   • Total CO2 10/26/2017 30.8* 22.0 - 29.0 mmol/L Final   • Calcium 10/26/2017 9.8  8.6 - 10.5 mg/dL Final   • Total Protein 10/26/2017 6.9  6.0 - 8.5 g/dL Final   • Albumin 10/26/2017 4.20  3.50 - 5.20 g/dL Final   • Globulin 10/26/2017 2.7  gm/dL Final   • A/G Ratio 10/26/2017 1.6  g/dL Final   • Total Bilirubin 10/26/2017 0.3  0.1 - 1.2 mg/dL Final   • Alkaline Phosphatase 10/26/2017 89  39 - 117 U/L Final   • AST (SGOT) 10/26/2017 20  1 - 40 U/L Final   • ALT (SGPT) 10/26/2017 23  1 - 41 U/L Final   • LDL-P 10/26/2017 1007* <1000 nmol/L Final    Comment:                           Low                   < 1000                            Moderate         1000 - 1299                            Borderline-High  1300 - 1599                            High             1600 - 2000                            Very High             > 2000     • LDL-C 10/26/2017 83  0 - 99 mg/dL Final    Comment:                           Optimal               <  100                            Above optimal     100 -  129                            Borderline        130 -   159                            High              160 -  189                            Very high             >  189  LDL-C is inaccurate if patient is non-fasting.     • HDL-C 10/26/2017 60  >39 mg/dL Final   • Triglycerides 10/26/2017 110  0 - 149 mg/dL Final   • Total Cholesterol 10/26/2017 165  100 - 199 mg/dL Final   • HDL-P (Total) 10/26/2017 38.3  >=30.5 umol/L Final   • Small LDL-P 10/26/2017 469  <=527 nmol/L Final   • LDL Size 10/26/2017 21.0  >20.5 nm Final    Comment:  ----------------------------------------------------------                   ** INTERPRETATIVE INFORMATION**                   PARTICLE CONCENTRATION AND SIZE                      <--Lower CVD Risk   Higher CVD Risk-->    LDL AND HDL PARTICLES   Percentile in Reference Population    HDL-P (total)        High     75th    50th    25th   Low                         >34.9    34.9    30.5    26.7   <26.7    Small LDL-P          Low      25th    50th    75th   High                         <117     117     527     839    >839    LDL Size   <-Large (Pattern A)->    <-Small (Pattern B)->                      23.0    20.6           20.5      19.0   ----------------------------------------------------------  Small LDL-P and LDL Size are associated with CVD risk, but not after  LDL-P is taken into account.  These assays were developed and their performance characteristics  determined by [facility]. These assays have not been cleared by the  [place] Food and Drug Administration. The clinical utility o                           f these  laboratory values have not been fully established.     • Hemoglobin A1C 10/26/2017 6.97* 4.80 - 5.60 % Final    Comment: Hemoglobin A1C Ranges:  Increased Risk for Diabetes  5.7% to 6.4%  Diabetes                     >= 6.5%  Diabetic Goal                < 7.0%     • Microalbumin, Urine 10/26/2017 130.6  Not Estab. ug/mL Final   • TSH 10/26/2017 1.210  0.270 - 4.200 mIU/mL Final   • Free T4 10/26/2017 0.64* 0.93 - 1.70 ng/dL  Final     Assessment/Plan   Apolinar was seen today for diabetes, hyperlipidemia, hypertension and sleep apnea.    Diagnoses and all orders for this visit:    Type 2 diabetes mellitus with complication, with long-term current use of insulin  -     Comprehensive Metabolic Panel  -     Lipid Panel  -     Hemoglobin A1c  -     Microalbumin / Creatinine Urine Ratio - Urine, Clean Catch    Diabetic peripheral neuropathy  -     Comprehensive Metabolic Panel  -     Lipid Panel  -     Hemoglobin A1c  -     Microalbumin / Creatinine Urine Ratio - Urine, Clean Catch    Diabetic retinopathy associated with type 2 diabetes mellitus, macular edema presence unspecified, unspecified laterality, unspecified retinopathy severity  -     Comprehensive Metabolic Panel  -     Lipid Panel  -     Hemoglobin A1c  -     Microalbumin / Creatinine Urine Ratio - Urine, Clean Catch    Acquired hypothyroidism  -     TSH  -     T4, Free    Hyperlipidemia, unspecified hyperlipidemia type  -     Lipid Panel    Essential hypertension    Gastroesophageal reflux disease, esophagitis presence not specified    Hjke-Ajjz-Jhhg syndrome    History of testicular cancer      Continue Lantus and Novolog.  Continue gabapentin.  Continue amlodipine,terazosin and Lasix.  Continue Zocor.  Consider switching to Lipitor.  Continue levothyroxine 75 µg per day.  Follow-up with Dr. Carey.    Send copy of my notes and labs to Dr. Carey and Dr. Fletcher    RTC 4 mos.

## 2018-01-09 LAB
ALBUMIN SERPL-MCNC: 4.3 G/DL (ref 3.5–5.2)
ALBUMIN/CREAT UR: 224.9 MG/G CREAT (ref 0–30)
ALBUMIN/GLOB SERPL: 1.5 G/DL
ALP SERPL-CCNC: 90 U/L (ref 39–117)
ALT SERPL-CCNC: 19 U/L (ref 1–41)
AST SERPL-CCNC: 18 U/L (ref 1–40)
BILIRUB SERPL-MCNC: 0.3 MG/DL (ref 0.1–1.2)
BUN SERPL-MCNC: 30 MG/DL (ref 8–23)
BUN/CREAT SERPL: 14.8 (ref 7–25)
CALCIUM SERPL-MCNC: 9.9 MG/DL (ref 8.6–10.5)
CHLORIDE SERPL-SCNC: 102 MMOL/L (ref 98–107)
CHOLEST SERPL-MCNC: 205 MG/DL (ref 0–200)
CO2 SERPL-SCNC: 27.9 MMOL/L (ref 22–29)
CREAT SERPL-MCNC: 2.03 MG/DL (ref 0.76–1.27)
CREAT UR-MCNC: 39.8 MG/DL
GLOBULIN SER CALC-MCNC: 2.8 GM/DL
GLUCOSE SERPL-MCNC: 133 MG/DL (ref 65–99)
HBA1C MFR BLD: 7.83 % (ref 4.8–5.6)
HDLC SERPL-MCNC: 61 MG/DL (ref 40–60)
INTERPRETATION: NORMAL
LDLC SERPL CALC-MCNC: 103 MG/DL (ref 0–100)
Lab: NORMAL
MICROALBUMIN UR-MCNC: 89.5 UG/ML
POTASSIUM SERPL-SCNC: 4.4 MMOL/L (ref 3.5–5.2)
PROT SERPL-MCNC: 7.1 G/DL (ref 6–8.5)
SODIUM SERPL-SCNC: 143 MMOL/L (ref 136–145)
T4 FREE SERPL-MCNC: 0.84 NG/DL (ref 0.93–1.7)
TRIGL SERPL-MCNC: 205 MG/DL (ref 0–150)
TSH SERPL DL<=0.005 MIU/L-ACNC: 0.29 MIU/ML (ref 0.27–4.2)
VLDLC SERPL CALC-MCNC: 41 MG/DL (ref 5–40)

## 2018-01-16 RX ORDER — ATORVASTATIN CALCIUM 40 MG/1
40 TABLET, FILM COATED ORAL NIGHTLY
Qty: 30 TABLET | Refills: 11
Start: 2018-01-16 | End: 2018-03-01 | Stop reason: ALTCHOICE

## 2018-02-15 DIAGNOSIS — E03.9 ACQUIRED HYPOTHYROIDISM: ICD-10-CM

## 2018-02-15 DIAGNOSIS — E78.5 HYPERLIPIDEMIA, UNSPECIFIED HYPERLIPIDEMIA TYPE: ICD-10-CM

## 2018-02-15 DIAGNOSIS — Z79.4 TYPE 2 DIABETES MELLITUS WITH COMPLICATION, WITH LONG-TERM CURRENT USE OF INSULIN (HCC): ICD-10-CM

## 2018-02-15 DIAGNOSIS — E11.8 TYPE 2 DIABETES MELLITUS WITH COMPLICATION, WITH LONG-TERM CURRENT USE OF INSULIN (HCC): ICD-10-CM

## 2018-02-16 RX ORDER — CLOTRIMAZOLE AND BETAMETHASONE DIPROPIONATE 10; .64 MG/G; MG/G
CREAM TOPICAL
Qty: 45 G | Refills: 2 | Status: SHIPPED | OUTPATIENT
Start: 2018-02-16 | End: 2018-11-07 | Stop reason: SDUPTHER

## 2018-02-23 RX ORDER — TERAZOSIN 5 MG/1
CAPSULE ORAL
Qty: 180 CAPSULE | Refills: 1 | Status: SHIPPED | OUTPATIENT
Start: 2018-02-23 | End: 2019-08-23 | Stop reason: SDUPTHER

## 2018-02-25 LAB
ALBUMIN SERPL-MCNC: 4 G/DL (ref 3.5–5.2)
ALBUMIN/GLOB SERPL: 1.5 G/DL
ALP SERPL-CCNC: 75 U/L (ref 39–117)
ALT SERPL-CCNC: 16 U/L (ref 1–41)
AST SERPL-CCNC: 16 U/L (ref 1–40)
BILIRUB SERPL-MCNC: 0.2 MG/DL (ref 0.1–1.2)
BUN SERPL-MCNC: 31 MG/DL (ref 8–23)
BUN/CREAT SERPL: 15.7 (ref 7–25)
CALCIUM SERPL-MCNC: 9 MG/DL (ref 8.6–10.5)
CHLORIDE SERPL-SCNC: 102 MMOL/L (ref 98–107)
CHOLEST SERPL-MCNC: 169 MG/DL (ref 100–199)
CO2 SERPL-SCNC: 27.4 MMOL/L (ref 22–29)
CREAT SERPL-MCNC: 1.97 MG/DL (ref 0.76–1.27)
GFR SERPLBLD CREATININE-BSD FMLA CKD-EPI: 34 ML/MIN/1.73
GFR SERPLBLD CREATININE-BSD FMLA CKD-EPI: 41 ML/MIN/1.73
GLOBULIN SER CALC-MCNC: 2.6 GM/DL
GLUCOSE SERPL-MCNC: 196 MG/DL (ref 65–99)
HBA1C MFR BLD: 7.66 % (ref 4.8–5.6)
HDL SERPL-SCNC: 28.8 UMOL/L
HDLC SERPL-MCNC: 42 MG/DL
LDL SERPL QN: 20.3 NM
LDL SERPL-SCNC: 1231 NMOL/L
LDL SMALL SERPL-SCNC: 714 NMOL/L
LDLC SERPL CALC-MCNC: 92 MG/DL (ref 0–99)
POTASSIUM SERPL-SCNC: 4.4 MMOL/L (ref 3.5–5.2)
PROT SERPL-MCNC: 6.6 G/DL (ref 6–8.5)
SODIUM SERPL-SCNC: 141 MMOL/L (ref 136–145)
T4 FREE SERPL-MCNC: 0.71 NG/DL (ref 0.93–1.7)
TRIGL SERPL-MCNC: 174 MG/DL (ref 0–149)
TSH SERPL DL<=0.005 MIU/L-ACNC: 0.56 MIU/ML (ref 0.27–4.2)

## 2018-02-27 ENCOUNTER — TELEPHONE (OUTPATIENT)
Dept: FAMILY MEDICINE CLINIC | Facility: CLINIC | Age: 68
End: 2018-02-27

## 2018-02-27 NOTE — TELEPHONE ENCOUNTER
PT HAS AN APPT WITH DR. ANDERSON THIS Thursday 03/01/2018- HE CAN GET RX FOR THIS AT APPOINTMENT. PT IS AWARE.     ----- Message from Agustina Gonzales MA sent at 2/23/2018  2:57 PM EST -----  Contact: PT  PT NEEDS RX REFILL FOR ALPRAZolam (XANAX) 0.5 MG tablet QTY 90            PT CAN BE REACHED [phone]

## 2018-03-01 ENCOUNTER — OFFICE VISIT (OUTPATIENT)
Dept: FAMILY MEDICINE CLINIC | Facility: CLINIC | Age: 68
End: 2018-03-01

## 2018-03-01 VITALS
TEMPERATURE: 98.4 F | SYSTOLIC BLOOD PRESSURE: 140 MMHG | HEART RATE: 74 BPM | DIASTOLIC BLOOD PRESSURE: 70 MMHG | WEIGHT: 244.4 LBS | HEIGHT: 74 IN | BODY MASS INDEX: 31.37 KG/M2 | OXYGEN SATURATION: 95 %

## 2018-03-01 DIAGNOSIS — F41.9 ANXIETY: ICD-10-CM

## 2018-03-01 DIAGNOSIS — E78.5 HYPERLIPIDEMIA, UNSPECIFIED HYPERLIPIDEMIA TYPE: ICD-10-CM

## 2018-03-01 DIAGNOSIS — E03.9 ACQUIRED HYPOTHYROIDISM: ICD-10-CM

## 2018-03-01 DIAGNOSIS — Z79.4 TYPE 2 DIABETES MELLITUS WITH COMPLICATION, WITH LONG-TERM CURRENT USE OF INSULIN (HCC): ICD-10-CM

## 2018-03-01 DIAGNOSIS — I10 ESSENTIAL HYPERTENSION: Primary | ICD-10-CM

## 2018-03-01 DIAGNOSIS — E11.8 TYPE 2 DIABETES MELLITUS WITH COMPLICATION, WITH LONG-TERM CURRENT USE OF INSULIN (HCC): ICD-10-CM

## 2018-03-01 DIAGNOSIS — N18.30 CHRONIC KIDNEY DISEASE, STAGE III (MODERATE) (HCC): ICD-10-CM

## 2018-03-01 PROCEDURE — 99214 OFFICE O/P EST MOD 30 MIN: CPT | Performed by: INTERNAL MEDICINE

## 2018-03-01 RX ORDER — LEVOTHYROXINE SODIUM 0.07 MG/1
75 TABLET ORAL DAILY
Qty: 90 TABLET | Refills: 3 | Status: SHIPPED | OUTPATIENT
Start: 2018-03-01 | End: 2018-12-11 | Stop reason: SDUPTHER

## 2018-03-01 RX ORDER — HYDROCODONE BITARTRATE AND ACETAMINOPHEN 10; 325 MG/1; MG/1
1 TABLET ORAL EVERY 4 HOURS PRN
Qty: 540 TABLET | Refills: 0 | Status: SHIPPED | OUTPATIENT
Start: 2018-03-01 | End: 2018-03-15 | Stop reason: SDUPTHER

## 2018-03-01 RX ORDER — LEVOTHYROXINE SODIUM 0.05 MG/1
TABLET ORAL
COMMUNITY
Start: 2018-02-23 | End: 2018-03-01

## 2018-03-01 RX ORDER — SIMVASTATIN 40 MG
TABLET ORAL
COMMUNITY
Start: 2018-02-16 | End: 2018-04-25 | Stop reason: SDUPTHER

## 2018-03-01 RX ORDER — BRIMONIDINE TARTRATE 2 MG/ML
SOLUTION/ DROPS OPHTHALMIC
COMMUNITY
Start: 2018-01-09

## 2018-03-01 RX ORDER — IMIQUIMOD 12.5 MG/.25G
CREAM TOPICAL
COMMUNITY
Start: 2018-02-20 | End: 2019-05-06

## 2018-03-01 RX ORDER — ALPRAZOLAM 0.5 MG/1
0.5 TABLET ORAL 2 TIMES DAILY PRN
Qty: 180 TABLET | Refills: 0 | Status: SHIPPED | OUTPATIENT
Start: 2018-03-01 | End: 2018-03-15 | Stop reason: SDUPTHER

## 2018-03-01 NOTE — PROGRESS NOTES
Subjective   Apolinar Tavarez is a 68 y.o. male. Patient is here today for follow-up on his hypertension, hyperlipidemia, diabetes mellitus, hypothyroidism, chronic kidney disease.  He also has sleep apnea.  He's had no chest pain, shortness of breath, edema or significant myalgias but complains of general fatigue and falling asleep a lot.  Other than that he has no new specific symptoms.  Chief Complaint   Patient presents with   • Diabetes     HLD, HTN, GERD, HYPOTHYROIDISM- FOLLOW UP LABS AND MEDS - NEEDS REFILL ON ALPRAZOLAM AND HYDROCODONE          Vitals:    03/01/18 1300   BP: 140/70   Pulse: 74   Temp: 98.4 °F (36.9 °C)   SpO2: 95%     The following portions of the patient's history were reviewed and updated as appropriate: allergies, current medications, past family history, past medical history, past social history, past surgical history and problem list.    Past Medical History:   Diagnosis Date   • Basal cell carcinoma    • Apoa-Potk-Wzua syndrome     Dr. Laurent   • Cancer of kidney    • GERD (gastroesophageal reflux disease)    • Hamartoma    • Hyperlipidemia    • Restrictive lung disease     Dr. Laurent   • Testicular cancer 1978    Status post left orchiectomy followed by radiation therapy   • Type 2 diabetes mellitus    • Urethral stricture     Post radiation therapy for testicular cancer      Allergies   Allergen Reactions   • Amoxicillin Nausea And Vomiting   • Clindamycin/Lincomycin Nausea And Vomiting   • Demerol [Meperidine] Hallucinations   • Dilaudid [Hydromorphone Hcl] Nausea And Vomiting      Social History     Social History   • Marital status:      Spouse name: N/A   • Number of children: N/A   • Years of education: N/A     Occupational History   • Not on file.     Social History Main Topics   • Smoking status: Never Smoker   • Smokeless tobacco: Never Used   • Alcohol use No   • Drug use: Not on file   • Sexual activity: Not on file     Other Topics Concern   • Not on file      Social History Narrative        Current Outpatient Prescriptions:   •  ALPRAZolam (XANAX) 0.5 MG tablet, Take 1 tablet by mouth 2 (Two) Times a Day As Needed for Anxiety., Disp: 180 tablet, Rfl: 0  •  amLODIPine (NORVASC) 5 MG tablet, Take 5 mg by mouth daily., Disp: , Rfl:   •  B Complex Vitamins (B COMPLEX PO), Take 1 tablet/day by mouth., Disp: , Rfl:   •  Blood Glucose Monitoring Suppl (ONE TOUCH ULTRA 2) W/DEVICE kit, TESTING BS 6 X DAY  DX CODE E11.8, Disp: 1 each, Rfl: 0  •  brimonidine (ALPHAGAN) 0.2 % ophthalmic solution, , Disp: , Rfl:   •  clotrimazole-betamethasone (LOTRISONE) 1-0.05 % cream, APPLY TOPICALLY TWICE DAILY, Disp: 45 g, Rfl: 2  •  DULoxetine (CYMBALTA) 30 MG capsule, TAKE 1 CAPSULE EVERY DAY, Disp: 90 capsule, Rfl: 5  •  finasteride (PROSCAR) 5 MG tablet, TAKE 1 TABLET EVERY DAY, Disp: 90 tablet, Rfl: 5  •  fluticasone (FLONASE) 50 MCG/ACT nasal spray, 2 sprays into each nostril daily., Disp: , Rfl:   •  furosemide (LASIX) 40 MG tablet, Take 20 mg by mouth daily., Disp: , Rfl:   •  gabapentin (NEURONTIN) 800 MG tablet, TAKE 1 TABLET FOUR TIMES DAILY, Disp: 360 tablet, Rfl: 1  •  glucagon (GLUCAGEN) 1 MG injection, Glucagon Emergency 1 MG Injection Kit; Patient Sig: Glucagon Emergency 1 MG Injection Kit USE AS DIRECTED.; 1; 3; 09-Apr-2015; Active, Disp: , Rfl:   •  glucose blood (ONE TOUCH ULTRA TEST) test strip, TESTING BS 6  X DAY  DX CODE  E11.8, Disp: 600 each, Rfl: 0  •  HYDROcodone-acetaminophen (NORCO)  MG per tablet, Take 1 tablet by mouth Every 4 (Four) Hours As Needed for Moderate Pain ., Disp: 540 tablet, Rfl: 0  •  imiquimod (ALDARA) 5 % cream, , Disp: , Rfl:   •  LANTUS 100 UNIT/ML injection, INJECT 35 UNITS SUBCUTANEOUSLY NIGHTLY (DISCARD VIAL 28 DAYS AFTER OPENING), Disp: 32 mL, Rfl: 1  •  Multiple Vitamins-Minerals (CENTRUM SILVER PO), Take 1 tablet/day by mouth., Disp: , Rfl:   •  NOVOLOG 100 UNIT/ML injection, INJECT 20 UNITS SUBCUTANEOUSLY 5 TO 6 TIMES DAILY WITH  MEALS (Patient taking differently: INJECT 20 UNITS SUBCUTANEOUSLY 4-6  times daily), Disp: 110 mL, Rfl: 1  •  omeprazole (priLOSEC) 20 MG capsule, TAKE 1 CAPSULE EVERY DAY, Disp: 90 capsule, Rfl: 3  •  simvastatin (ZOCOR) 40 MG tablet, , Disp: , Rfl:   •  SYMBICORT 160-4.5 MCG/ACT inhaler, Inhale 2 puffs 2 (two) times a day., Disp: , Rfl:   •  terazosin (HYTRIN) 5 MG capsule, TAKE 1 CAPSULE TWICE DAILY, Disp: 180 capsule, Rfl: 1  •  Vit C-Cholecalciferol-Delores Hip 500-1000-20 MG-UNIT-MG capsule, Take 1,000 tablet/day by mouth., Disp: , Rfl:   •  vitamin D (ERGOCALCIFEROL) 10853 UNITS capsule capsule, Take 50,000 Units by mouth every 7 days., Disp: , Rfl:   •  levothyroxine (SYNTHROID, LEVOTHROID) 75 MCG tablet, Take 1 tablet by mouth Daily., Disp: 90 tablet, Rfl: 3     Objective     History of Present Illness     Review of Systems   Constitutional: Negative.    HENT: Negative.    Eyes: Negative.    Respiratory: Negative.    Cardiovascular: Negative.    Gastrointestinal: Negative.    Genitourinary: Negative.    Musculoskeletal: Negative.    Skin: Negative.    Neurological: Negative.    Psychiatric/Behavioral: Negative.        Physical Exam   Constitutional: He is oriented to person, place, and time. He appears well-developed and well-nourished.   Pleasant, cooperative no acute distress blood pressure 140/80   HENT:   Head: Normocephalic and atraumatic.   Eyes: Conjunctivae are normal. No scleral icterus.   Neck: Normal range of motion. Neck supple. No thyromegaly present.   Cardiovascular: Normal rate, regular rhythm and normal heart sounds.    Pulmonary/Chest: Effort normal and breath sounds normal. No respiratory distress. He has no wheezes. He has no rales.   Musculoskeletal: Normal range of motion.   Neurological: He is alert and oriented to person, place, and time.   Skin: Skin is warm and dry.   Psychiatric: He has a normal mood and affect. His behavior is normal.   Nursing note and vitals  reviewed.      ASSESSMENT  CMP has an elevated sugar of 196 and a creatinine stable at 1.97.  Other values were normal.  Hemoglobin A1c is somewhat high at 7.66.  TSH was normal but free T4 was low at 0.71.  Lipid panel had a total cholesterol 169, HDL 42, LDL 92 with the particle number in the moderate range  #1-hypertension, fair control  #2-hyperlipidemia, fair control on medication  #3-diabetes mellitus type 2, reasonable control considering age  #4-hypothyroidism, undertreated  #5-chronic fatigue, uncertain cause  His Glen was reviewed and is appropriate.     Problem List Items Addressed This Visit        Cardiovascular and Mediastinum    Hyperlipidemia    Relevant Medications    simvastatin (ZOCOR) 40 MG tablet    Essential hypertension - Primary       Endocrine    Type 2 diabetes mellitus with complication, with long-term current use of insulin    Acquired hypothyroidism    Relevant Medications    levothyroxine (SYNTHROID, LEVOTHROID) 75 MCG tablet       Genitourinary    Chronic kidney disease, stage III (moderate)       Other    Anxiety          PLAN  I'm increasing the patient's levothyroxine to 75 µg daily and he will continue other medicines.  I refilled his hydrocodone and alprazolam.  I plan on rechecking him in 3 months with a CBC, CMP, NMR lipid panel, hemoglobin A1c and TSH and free T4    There are no Patient Instructions on file for this visit.  Return in about 3 months (around 6/1/2018) for with labs.

## 2018-03-15 RX ORDER — ALPRAZOLAM 0.5 MG/1
0.5 TABLET ORAL 2 TIMES DAILY PRN
Qty: 180 TABLET | Refills: 0 | Status: SHIPPED | OUTPATIENT
Start: 2018-03-15 | End: 2018-05-08 | Stop reason: SDUPTHER

## 2018-03-15 RX ORDER — HYDROCODONE BITARTRATE AND ACETAMINOPHEN 10; 325 MG/1; MG/1
1 TABLET ORAL EVERY 4 HOURS PRN
Qty: 540 TABLET | Refills: 0 | Status: SHIPPED | OUTPATIENT
Start: 2018-03-15 | End: 2018-05-08 | Stop reason: SDUPTHER

## 2018-03-19 ENCOUNTER — TELEPHONE (OUTPATIENT)
Dept: FAMILY MEDICINE CLINIC | Facility: CLINIC | Age: 68
End: 2018-03-19

## 2018-03-19 NOTE — TELEPHONE ENCOUNTER
RX IS UP FRONT AND READY TO BE PICKED UP. PT HAS BEEN NOTIFIED.     ----- Message from Ellis Carroll sent at 3/14/2018  4:23 PM EDT -----  PT STATING MAILED SCRIPTS TO [facility] 2 WEEKS AGO AND THEY STILL HAVE NOT RECEIVED THEM.  PT NEEDS NEW SCRIPTS WRITTEN FOR     HYDROcodone-acetaminophen (NORCO)  MG Take 1 tablet by mouth Every 4 (Four) Hours As Needed for Moderate Pain #540    ALPRAZolam (XANAX) 0.5 MG Take 1 tablet by mouth 2 (Two) Times a Day As Needed for Anxiety #180    PLEASE CONTACT PT WHEN READY FOR  [phone]

## 2018-04-25 RX ORDER — SIMVASTATIN 40 MG
TABLET ORAL
Qty: 90 TABLET | Refills: 3 | Status: SHIPPED | OUTPATIENT
Start: 2018-04-25 | End: 2019-08-23

## 2018-04-25 RX ORDER — OMEPRAZOLE 20 MG/1
CAPSULE, DELAYED RELEASE ORAL
Qty: 90 CAPSULE | Refills: 3 | Status: SHIPPED | OUTPATIENT
Start: 2018-04-25 | End: 2019-08-23 | Stop reason: SDUPTHER

## 2018-05-08 ENCOUNTER — TELEPHONE (OUTPATIENT)
Dept: FAMILY MEDICINE CLINIC | Facility: CLINIC | Age: 68
End: 2018-05-08

## 2018-05-08 RX ORDER — ALPRAZOLAM 0.5 MG/1
0.5 TABLET ORAL 2 TIMES DAILY PRN
Qty: 180 TABLET | Refills: 0 | Status: SHIPPED | OUTPATIENT
Start: 2018-05-08

## 2018-05-08 RX ORDER — HYDROCODONE BITARTRATE AND ACETAMINOPHEN 10; 325 MG/1; MG/1
1 TABLET ORAL EVERY 4 HOURS PRN
Qty: 180 TABLET | Refills: 0 | Status: SHIPPED | OUTPATIENT
Start: 2018-05-08 | End: 2018-06-29 | Stop reason: SDUPTHER

## 2018-05-08 RX ORDER — IMIQUIMOD 12.5 MG/.25G
CREAM TOPICAL
Status: CANCELLED | OUTPATIENT
Start: 2018-05-08

## 2018-05-08 NOTE — TELEPHONE ENCOUNTER
RX'S ARE UP FRONT AND READY TO BE PICKED UP. PT IS AWARE.     ----- Message from Clemencia Arevalo sent at 5/8/2018 12:40 PM EDT -----  Needs  A rx for     alprazolam .5mg    Bid   # 180   Hydrocodone 10/325 6 a day   # 180     Please call when ready to      [phone]

## 2018-05-31 DIAGNOSIS — E03.9 ACQUIRED HYPOTHYROIDISM: ICD-10-CM

## 2018-05-31 DIAGNOSIS — E11.8 TYPE 2 DIABETES MELLITUS WITH COMPLICATION, WITH LONG-TERM CURRENT USE OF INSULIN (HCC): ICD-10-CM

## 2018-05-31 DIAGNOSIS — E78.5 HYPERLIPIDEMIA, UNSPECIFIED HYPERLIPIDEMIA TYPE: ICD-10-CM

## 2018-05-31 DIAGNOSIS — Z79.4 TYPE 2 DIABETES MELLITUS WITH COMPLICATION, WITH LONG-TERM CURRENT USE OF INSULIN (HCC): ICD-10-CM

## 2018-06-10 LAB
ALBUMIN SERPL-MCNC: 3.8 G/DL (ref 3.5–5.2)
ALBUMIN/GLOB SERPL: 1.5 G/DL
ALP SERPL-CCNC: 84 U/L (ref 39–117)
ALT SERPL-CCNC: 13 U/L (ref 1–41)
AST SERPL-CCNC: 13 U/L (ref 1–40)
BASOPHILS # BLD AUTO: 0.05 10*3/MM3 (ref 0–0.2)
BASOPHILS NFR BLD AUTO: 0.5 % (ref 0–1.5)
BILIRUB SERPL-MCNC: <0.2 MG/DL (ref 0.1–1.2)
BUN SERPL-MCNC: 29 MG/DL (ref 8–23)
BUN/CREAT SERPL: 15.9 (ref 7–25)
CALCIUM SERPL-MCNC: 9.7 MG/DL (ref 8.6–10.5)
CHLORIDE SERPL-SCNC: 107 MMOL/L (ref 98–107)
CHOLEST SERPL-MCNC: 158 MG/DL (ref 100–199)
CO2 SERPL-SCNC: 25.7 MMOL/L (ref 22–29)
CREAT SERPL-MCNC: 1.82 MG/DL (ref 0.76–1.27)
EOSINOPHIL # BLD AUTO: 0.27 10*3/MM3 (ref 0–0.7)
EOSINOPHIL NFR BLD AUTO: 2.9 % (ref 0.3–6.2)
ERYTHROCYTE [DISTWIDTH] IN BLOOD BY AUTOMATED COUNT: 13.8 % (ref 11.5–14.5)
GFR SERPLBLD CREATININE-BSD FMLA CKD-EPI: 37 ML/MIN/1.73
GFR SERPLBLD CREATININE-BSD FMLA CKD-EPI: 45 ML/MIN/1.73
GLOBULIN SER CALC-MCNC: 2.5 GM/DL
GLUCOSE SERPL-MCNC: 32 MG/DL (ref 65–99)
HBA1C MFR BLD: 8.22 % (ref 4.8–5.6)
HCT VFR BLD AUTO: 38.8 % (ref 40.4–52.2)
HDL SERPL-SCNC: 31.4 UMOL/L
HDLC SERPL-MCNC: 53 MG/DL
HGB BLD-MCNC: 11.9 G/DL (ref 13.7–17.6)
IMM GRANULOCYTES # BLD: 0.06 10*3/MM3 (ref 0–0.03)
IMM GRANULOCYTES NFR BLD: 0.6 % (ref 0–0.5)
LDL SERPL QN: 20.7 NM
LDL SERPL-SCNC: 1122 NMOL/L
LDL SMALL SERPL-SCNC: 562 NMOL/L
LDLC SERPL CALC-MCNC: 90 MG/DL (ref 0–99)
LYMPHOCYTES # BLD AUTO: 1.76 10*3/MM3 (ref 0.9–4.8)
LYMPHOCYTES NFR BLD AUTO: 18.8 % (ref 19.6–45.3)
MCH RBC QN AUTO: 30.2 PG (ref 27–32.7)
MCHC RBC AUTO-ENTMCNC: 30.7 G/DL (ref 32.6–36.4)
MCV RBC AUTO: 98.5 FL (ref 79.8–96.2)
MONOCYTES # BLD AUTO: 0.84 10*3/MM3 (ref 0.2–1.2)
MONOCYTES NFR BLD AUTO: 9 % (ref 5–12)
NEUTROPHILS # BLD AUTO: 6.39 10*3/MM3 (ref 1.9–8.1)
NEUTROPHILS NFR BLD AUTO: 68.2 % (ref 42.7–76)
PLATELET # BLD AUTO: 408 10*3/MM3 (ref 140–500)
POTASSIUM SERPL-SCNC: 4.6 MMOL/L (ref 3.5–5.2)
PROT SERPL-MCNC: 6.3 G/DL (ref 6–8.5)
RBC # BLD AUTO: 3.94 10*6/MM3 (ref 4.6–6)
SODIUM SERPL-SCNC: 145 MMOL/L (ref 136–145)
T4 FREE SERPL-MCNC: 1.03 NG/DL (ref 0.93–1.7)
TRIGL SERPL-MCNC: 77 MG/DL (ref 0–149)
TSH SERPL DL<=0.005 MIU/L-ACNC: 0.19 MIU/ML (ref 0.27–4.2)
WBC # BLD AUTO: 9.37 10*3/MM3 (ref 4.5–10.7)

## 2018-06-13 ENCOUNTER — TELEPHONE (OUTPATIENT)
Dept: ENDOCRINOLOGY | Age: 68
End: 2018-06-13

## 2018-06-13 NOTE — TELEPHONE ENCOUNTER
----- Message from Ellis Mendez sent at 6/13/2018 10:18 AM EDT -----  Contact: PATIENT  PATIENT IS REQUESTING SCRIPT OF ONE TOUCH ULTRA TEST test strip BE SENT TO [facility]. PATIENT STATED HE ALWAYS GETS HIS FROM [facility] NOT THE [facility]IL ORDER [facility]. PATIENT IS DOWN TO 6 STRIPS.     [facility] [address] [place] [id number] [facility] - [phone]  - [phone]  [phone] (Phone)  [phone] (Fax)    PATIENT TEST 6 TIMES A DAY

## 2018-06-15 ENCOUNTER — TELEPHONE (OUTPATIENT)
Dept: ENDOCRINOLOGY | Age: 68
End: 2018-06-15

## 2018-06-15 NOTE — TELEPHONE ENCOUNTER
----- Message from Ellis Mendez Rep sent at 6/15/2018 10:37 AM EDT -----  Contact: PATIENT  PLEASE SEND SCRIPT TO [facility], PATIENT IS COMPLETELY OUT,  SCRIPT WENT TO WRONG PHARMACY. THANK YOU   ----- Message -----  From: Ellis Mendez Rep  Sent: 6/13/2018  10:18 AM  To: Hali Brewer MA    PATIENT IS REQUESTING SCRIPT OF ONE TOUCH ULTRA TEST test strip BE SENT TO [facility]. PATIENT STATED HE ALWAYS GETS HIS FROM [facility] NOT THE [facility]IL ORDER [facility]. PATIENT IS DOWN TO 6 STRIPS.     [facility] [address] [place] [id number] [facility] - [phone]  - [phone]  [phone] (Phone)  [phone] (Fax)    PATIENT TEST 6 TIMES A DAY

## 2018-06-29 ENCOUNTER — OFFICE VISIT (OUTPATIENT)
Dept: FAMILY MEDICINE CLINIC | Facility: CLINIC | Age: 68
End: 2018-06-29

## 2018-06-29 VITALS
HEART RATE: 78 BPM | DIASTOLIC BLOOD PRESSURE: 68 MMHG | BODY MASS INDEX: 31.32 KG/M2 | HEIGHT: 74 IN | WEIGHT: 244 LBS | SYSTOLIC BLOOD PRESSURE: 142 MMHG | RESPIRATION RATE: 18 BRPM

## 2018-06-29 DIAGNOSIS — E03.9 ACQUIRED HYPOTHYROIDISM: ICD-10-CM

## 2018-06-29 DIAGNOSIS — N18.30 CHRONIC KIDNEY DISEASE, STAGE III (MODERATE) (HCC): ICD-10-CM

## 2018-06-29 DIAGNOSIS — I10 ESSENTIAL HYPERTENSION: ICD-10-CM

## 2018-06-29 DIAGNOSIS — E78.5 HYPERLIPIDEMIA, UNSPECIFIED HYPERLIPIDEMIA TYPE: Primary | ICD-10-CM

## 2018-06-29 DIAGNOSIS — E11.8 TYPE 2 DIABETES MELLITUS WITH COMPLICATION, WITH LONG-TERM CURRENT USE OF INSULIN (HCC): ICD-10-CM

## 2018-06-29 DIAGNOSIS — G47.00 INSOMNIA, UNSPECIFIED TYPE: ICD-10-CM

## 2018-06-29 DIAGNOSIS — Z79.4 TYPE 2 DIABETES MELLITUS WITH COMPLICATION, WITH LONG-TERM CURRENT USE OF INSULIN (HCC): ICD-10-CM

## 2018-06-29 PROCEDURE — 99214 OFFICE O/P EST MOD 30 MIN: CPT | Performed by: INTERNAL MEDICINE

## 2018-06-29 RX ORDER — HYDROCODONE BITARTRATE AND ACETAMINOPHEN 10; 325 MG/1; MG/1
1 TABLET ORAL EVERY 4 HOURS PRN
Qty: 180 TABLET | Refills: 0 | Status: SHIPPED | OUTPATIENT
Start: 2018-06-29 | End: 2018-08-14 | Stop reason: SDUPTHER

## 2018-06-29 NOTE — PROGRESS NOTES
Subjective   Apolinar Tavarez is a 68 y.o. male. Patient is here today for follow-up on his hypertension, hyperlipidemia, diabetes mellitus type 2 and hypothyroidism.  He also has chronic kidney disease, breathing problems.  He does see an endocrinologist.  He's had no new acute symptoms  Chief Complaint   Patient presents with   • Hyperlipidemia   • Hypertension   • Diabetes          Vitals:    06/29/18 1320   BP: 142/68   Pulse: 78   Resp: 18     The following portions of the patient's history were reviewed and updated as appropriate: allergies, current medications, past family history, past medical history, past social history, past surgical history and problem list.    Past Medical History:   Diagnosis Date   • Basal cell carcinoma    • Zexg-Cnct-Spcm syndrome     Dr. Laurent   • Cancer of kidney    • GERD (gastroesophageal reflux disease)    • Hamartoma    • Hyperlipidemia    • Restrictive lung disease     Dr. Laurent   • Testicular cancer 1978    Status post left orchiectomy followed by radiation therapy   • Type 2 diabetes mellitus    • Urethral stricture     Post radiation therapy for testicular cancer      Allergies   Allergen Reactions   • Amoxicillin Nausea And Vomiting     Other reaction(s): Nausea And Vomiting   • Clindamycin/Lincomycin Nausea And Vomiting   • Demerol [Meperidine] Hallucinations   • Dilaudid [Hydromorphone Hcl] Nausea And Vomiting      Social History     Social History   • Marital status:      Spouse name: N/A   • Number of children: N/A   • Years of education: N/A     Occupational History   • Not on file.     Social History Main Topics   • Smoking status: Never Smoker   • Smokeless tobacco: Never Used   • Alcohol use No   • Drug use: Unknown   • Sexual activity: Not on file     Other Topics Concern   • Not on file     Social History Narrative   • No narrative on file        Current Outpatient Prescriptions:   •  ALPRAZolam (XANAX) 0.5 MG tablet, Take 1 tablet by mouth 2 (Two)  Times a Day As Needed for Anxiety., Disp: 180 tablet, Rfl: 0  •  amLODIPine (NORVASC) 5 MG tablet, Take 5 mg by mouth daily., Disp: , Rfl:   •  B Complex Vitamins (B COMPLEX PO), Take 1 tablet/day by mouth., Disp: , Rfl:   •  Blood Glucose Monitoring Suppl (ONE TOUCH ULTRA 2) W/DEVICE kit, TESTING BS 6 X DAY  DX CODE E11.8, Disp: 1 each, Rfl: 0  •  brimonidine (ALPHAGAN) 0.2 % ophthalmic solution, , Disp: , Rfl:   •  clotrimazole-betamethasone (LOTRISONE) 1-0.05 % cream, APPLY TOPICALLY TWICE DAILY, Disp: 45 g, Rfl: 2  •  DULoxetine (CYMBALTA) 30 MG capsule, TAKE 1 CAPSULE EVERY DAY, Disp: 90 capsule, Rfl: 5  •  finasteride (PROSCAR) 5 MG tablet, TAKE 1 TABLET EVERY DAY, Disp: 90 tablet, Rfl: 5  •  fluticasone (FLONASE) 50 MCG/ACT nasal spray, 2 sprays into each nostril daily., Disp: , Rfl:   •  furosemide (LASIX) 40 MG tablet, Take 20 mg by mouth daily., Disp: , Rfl:   •  gabapentin (NEURONTIN) 800 MG tablet, TAKE 1 TABLET FOUR TIMES DAILY, Disp: 360 tablet, Rfl: 1  •  glucagon (GLUCAGEN) 1 MG injection, Glucagon Emergency 1 MG Injection Kit; Patient Sig: Glucagon Emergency 1 MG Injection Kit USE AS DIRECTED.; 1; 3; 09-Apr-2015; Active, Disp: , Rfl:   •  HYDROcodone-acetaminophen (NORCO)  MG per tablet, Take 1 tablet by mouth Every 4 (Four) Hours As Needed for Moderate Pain ., Disp: 180 tablet, Rfl: 0  •  imiquimod (ALDARA) 5 % cream, , Disp: , Rfl:   •  LANTUS 100 UNIT/ML injection, INJECT 35 UNITS SUBCUTANEOUSLY NIGHTLY (DISCARD VIAL 28 DAYS AFTER OPENING), Disp: 32 mL, Rfl: 1  •  levothyroxine (SYNTHROID, LEVOTHROID) 75 MCG tablet, Take 1 tablet by mouth Daily., Disp: 90 tablet, Rfl: 3  •  Multiple Vitamins-Minerals (CENTRUM SILVER PO), Take 1 tablet/day by mouth., Disp: , Rfl:   •  NOVOLOG 100 UNIT/ML injection, INJECT 20 UNITS SUBCUTANEOUSLY 5 TO 6 TIMES DAILY WITH MEALS (Patient taking differently: INJECT 20 UNITS SUBCUTANEOUSLY 4-6  times daily), Disp: 110 mL, Rfl: 1  •  omeprazole (priLOSEC) 20 MG  capsule, TAKE 1 CAPSULE EVERY DAY, Disp: 90 capsule, Rfl: 3  •  ONE TOUCH ULTRA TEST test strip, TESTING BS 8  X DAY  DX CODE  E11.8, Disp: 800 each, Rfl: 0  •  simvastatin (ZOCOR) 40 MG tablet, TAKE 1 TABLET BY MOUTH EVERY NIGHT., Disp: 90 tablet, Rfl: 3  •  SYMBICORT 160-4.5 MCG/ACT inhaler, Inhale 2 puffs 2 (two) times a day., Disp: , Rfl:   •  terazosin (HYTRIN) 5 MG capsule, TAKE 1 CAPSULE TWICE DAILY, Disp: 180 capsule, Rfl: 1  •  Vit C-Cholecalciferol-Delores Hip 500-1000-20 MG-UNIT-MG capsule, Take 1,000 tablet/day by mouth., Disp: , Rfl:   •  vitamin D (ERGOCALCIFEROL) 48935 UNITS capsule capsule, Take 50,000 Units by mouth every 7 days., Disp: , Rfl:      Objective     History of Present Illness     Review of Systems   Constitutional: Negative.    HENT: Negative.    Eyes: Negative.    Respiratory: Negative.    Cardiovascular: Negative.    Gastrointestinal: Negative.    Endocrine: Negative.    Genitourinary: Negative.    Musculoskeletal: Negative.    Skin: Negative.    Allergic/Immunologic: Negative.    Neurological: Negative.    Psychiatric/Behavioral: Negative.        Physical Exam   Constitutional: He is oriented to person, place, and time. He appears well-developed and well-nourished.   Pleasant, cooperative no distress, blood pressure 140/80   HENT:   Head: Normocephalic and atraumatic.   Eyes: Conjunctivae are normal. Pupils are equal, round, and reactive to light. No scleral icterus.   Neck: Normal range of motion. Neck supple. No thyromegaly present.   Cardiovascular: Normal rate, regular rhythm and normal heart sounds.    Pulmonary/Chest: Effort normal and breath sounds normal. No respiratory distress. He has no wheezes. He has no rales.   Musculoskeletal: Normal range of motion. He exhibits no edema.   Neurological: He is alert and oriented to person, place, and time.   Skin: Skin is warm and dry.   Psychiatric: He has a normal mood and affect. His behavior is normal.   Nursing note and vitals  reviewed.      ASSESSMENT  CBC was generally normal with just minimally low red cells that are stable.  CMP from 3 weeks ago had a sugar of 32, extremely low but creatinine is high and stable at 1.82 and values were otherwise normal.  Hemoglobin A1c was elevated at 8.22 any will be seeing the endocrinologist.  Total cholesterol was good at 158, HDL 53, LDL 90, particle number stable moderate range.  TSH was just minimally low but free T4 is normal at 1.03, certainly not very high.  #1-hypertension, well controlled on medication  #2-hyperlipidemia, reasonably controlled on medicine  #3-diabetes mellitus type 2, not optimally controlled  #4 hypothyroidism with slightly low TSH but normal free T4, clinically overall euthyroid  #5-chronic kidney disease stage III stable his Glen was reviewed and is appropriate.       Problem List Items Addressed This Visit        Cardiovascular and Mediastinum    Hyperlipidemia - Primary    Essential hypertension       Endocrine    Type 2 diabetes mellitus with complication, with long-term current use of insulin    Acquired hypothyroidism       Genitourinary    Chronic kidney disease, stage III (moderate)       Other    Insomnia          PLAN  Patient will continue current medications as now.He does have an upcoming endocrinology visit and we'll continue with that as well as with renal.  I plan on rechecking him in 4 months with a CBC, CMP, NMR lipid panel, hemoglobin A1c, urine microalbumin and TSH and free T4    There are no Patient Instructions on file for this visit.  Return in about 4 months (around 10/29/2018) for with labs.

## 2018-08-14 ENCOUNTER — TELEPHONE (OUTPATIENT)
Dept: FAMILY MEDICINE CLINIC | Facility: CLINIC | Age: 68
End: 2018-08-14

## 2018-08-14 RX ORDER — HYDROCODONE BITARTRATE AND ACETAMINOPHEN 10; 325 MG/1; MG/1
1 TABLET ORAL EVERY 4 HOURS PRN
Qty: 180 TABLET | Refills: 0 | Status: SHIPPED | OUTPATIENT
Start: 2018-08-14

## 2018-08-14 NOTE — TELEPHONE ENCOUNTER
RX IS UP FRONT AND READY TO BE PICKED UP. PT IS AWARE.       ----- Message from Ellis Carroll sent at 8/13/2018  3:50 PM EDT -----  PT NEEDS SCRIPT REFILL FOR HYDROcodone-acetaminophen (NORCO)  MG Take 1 tablet by mouth Every 4 (Four) Hours As Needed for Moderate Pain #180    PLEASE CONTACT PT WHEN READY FOR  [phone]

## 2018-09-18 DIAGNOSIS — E11.8 TYPE 2 DIABETES MELLITUS WITH COMPLICATION, WITH LONG-TERM CURRENT USE OF INSULIN (HCC): ICD-10-CM

## 2018-09-18 DIAGNOSIS — Z79.4 TYPE 2 DIABETES MELLITUS WITH COMPLICATION, WITH LONG-TERM CURRENT USE OF INSULIN (HCC): ICD-10-CM

## 2018-09-18 DIAGNOSIS — N13.9 LOWER OBSTRUCTIVE UROPATHY: ICD-10-CM

## 2018-09-18 RX ORDER — FINASTERIDE 5 MG/1
5 TABLET, FILM COATED ORAL DAILY
Qty: 90 TABLET | Refills: 0 | Status: SHIPPED | OUTPATIENT
Start: 2018-09-18 | End: 2019-04-16 | Stop reason: SDUPTHER

## 2018-09-18 RX ORDER — DULOXETIN HYDROCHLORIDE 30 MG/1
30 CAPSULE, DELAYED RELEASE ORAL DAILY
Qty: 90 CAPSULE | Refills: 0 | Status: SHIPPED | OUTPATIENT
Start: 2018-09-18

## 2018-10-23 DIAGNOSIS — E78.5 HYPERLIPIDEMIA, UNSPECIFIED HYPERLIPIDEMIA TYPE: Primary | ICD-10-CM

## 2018-10-23 DIAGNOSIS — E11.69 TYPE 2 DIABETES MELLITUS WITH OTHER SPECIFIED COMPLICATION, WITH LONG-TERM CURRENT USE OF INSULIN (HCC): ICD-10-CM

## 2018-10-23 DIAGNOSIS — Z79.4 TYPE 2 DIABETES MELLITUS WITH OTHER SPECIFIED COMPLICATION, WITH LONG-TERM CURRENT USE OF INSULIN (HCC): ICD-10-CM

## 2018-10-23 DIAGNOSIS — I10 ESSENTIAL HYPERTENSION: ICD-10-CM

## 2018-11-07 RX ORDER — CLOTRIMAZOLE AND BETAMETHASONE DIPROPIONATE 10; .64 MG/G; MG/G
CREAM TOPICAL
Qty: 45 G | Refills: 0 | Status: SHIPPED | OUTPATIENT
Start: 2018-11-07

## 2018-11-21 DIAGNOSIS — Z79.4 TYPE 2 DIABETES MELLITUS WITH COMPLICATION, WITH LONG-TERM CURRENT USE OF INSULIN (HCC): ICD-10-CM

## 2018-11-21 DIAGNOSIS — N13.9 LOWER OBSTRUCTIVE UROPATHY: ICD-10-CM

## 2018-11-21 DIAGNOSIS — E11.8 TYPE 2 DIABETES MELLITUS WITH COMPLICATION, WITH LONG-TERM CURRENT USE OF INSULIN (HCC): ICD-10-CM

## 2018-11-21 RX ORDER — DULOXETIN HYDROCHLORIDE 30 MG/1
CAPSULE, DELAYED RELEASE ORAL
Qty: 90 CAPSULE | Refills: 0 | OUTPATIENT
Start: 2018-11-21

## 2018-11-21 RX ORDER — FINASTERIDE 5 MG/1
TABLET, FILM COATED ORAL
Qty: 90 TABLET | Refills: 0 | OUTPATIENT
Start: 2018-11-21

## 2018-12-12 DIAGNOSIS — Z79.4 TYPE 2 DIABETES MELLITUS WITH COMPLICATION, WITH LONG-TERM CURRENT USE OF INSULIN (HCC): ICD-10-CM

## 2018-12-12 DIAGNOSIS — E11.8 TYPE 2 DIABETES MELLITUS WITH COMPLICATION, WITH LONG-TERM CURRENT USE OF INSULIN (HCC): ICD-10-CM

## 2018-12-12 RX ORDER — DULOXETIN HYDROCHLORIDE 30 MG/1
CAPSULE, DELAYED RELEASE ORAL
Qty: 90 CAPSULE | Refills: 0 | OUTPATIENT
Start: 2018-12-12

## 2018-12-12 RX ORDER — LEVOTHYROXINE SODIUM 0.07 MG/1
TABLET ORAL
Qty: 30 TABLET | Refills: 0 | Status: SHIPPED | OUTPATIENT
Start: 2018-12-12 | End: 2019-08-23 | Stop reason: SDUPTHER

## 2019-01-04 RX ORDER — LEVOTHYROXINE SODIUM 0.07 MG/1
TABLET ORAL
Qty: 30 TABLET | Refills: 0 | OUTPATIENT
Start: 2019-01-04

## 2019-01-12 DIAGNOSIS — E11.8 TYPE 2 DIABETES MELLITUS WITH COMPLICATION, WITH LONG-TERM CURRENT USE OF INSULIN (HCC): ICD-10-CM

## 2019-01-12 DIAGNOSIS — Z79.4 TYPE 2 DIABETES MELLITUS WITH COMPLICATION, WITH LONG-TERM CURRENT USE OF INSULIN (HCC): ICD-10-CM

## 2019-01-14 RX ORDER — INSULIN GLARGINE 100 [IU]/ML
INJECTION, SOLUTION SUBCUTANEOUS
Qty: 30 ML | Refills: 1 | OUTPATIENT
Start: 2019-01-14

## 2019-03-18 DIAGNOSIS — N13.9 LOWER OBSTRUCTIVE UROPATHY: ICD-10-CM

## 2019-03-18 RX ORDER — FINASTERIDE 5 MG/1
TABLET, FILM COATED ORAL
Qty: 90 TABLET | Refills: 0 | OUTPATIENT
Start: 2019-03-18

## 2019-03-19 RX ORDER — LEVOTHYROXINE SODIUM 0.07 MG/1
TABLET ORAL
Qty: 90 TABLET | Refills: 3 | OUTPATIENT
Start: 2019-03-19

## 2019-03-19 RX ORDER — OMEPRAZOLE 20 MG/1
CAPSULE, DELAYED RELEASE ORAL
Qty: 90 CAPSULE | Refills: 3 | OUTPATIENT
Start: 2019-03-19

## 2019-03-19 RX ORDER — TERAZOSIN 5 MG/1
CAPSULE ORAL
Qty: 180 CAPSULE | Refills: 1 | OUTPATIENT
Start: 2019-03-19

## 2019-03-19 RX ORDER — SIMVASTATIN 40 MG
TABLET ORAL
Qty: 90 TABLET | Refills: 3 | OUTPATIENT
Start: 2019-03-19

## 2019-03-19 RX ORDER — ALPRAZOLAM 0.5 MG/1
TABLET ORAL
Qty: 180 TABLET | Refills: 1 | OUTPATIENT
Start: 2019-03-19

## 2019-03-26 DIAGNOSIS — E11.8 TYPE 2 DIABETES MELLITUS WITH COMPLICATION, WITH LONG-TERM CURRENT USE OF INSULIN (HCC): ICD-10-CM

## 2019-03-26 DIAGNOSIS — I10 ESSENTIAL HYPERTENSION: Primary | ICD-10-CM

## 2019-03-26 DIAGNOSIS — E03.9 ACQUIRED HYPOTHYROIDISM: ICD-10-CM

## 2019-03-26 DIAGNOSIS — E78.49 OTHER HYPERLIPIDEMIA: ICD-10-CM

## 2019-03-26 DIAGNOSIS — Z79.4 TYPE 2 DIABETES MELLITUS WITH COMPLICATION, WITH LONG-TERM CURRENT USE OF INSULIN (HCC): ICD-10-CM

## 2019-04-16 DIAGNOSIS — N13.9 LOWER OBSTRUCTIVE UROPATHY: ICD-10-CM

## 2019-04-16 RX ORDER — FINASTERIDE 5 MG/1
5 TABLET, FILM COATED ORAL DAILY
Qty: 90 TABLET | Refills: 0 | Status: SHIPPED | OUTPATIENT
Start: 2019-04-16 | End: 2019-06-21 | Stop reason: SDUPTHER

## 2019-04-22 ENCOUNTER — RESULTS ENCOUNTER (OUTPATIENT)
Dept: ENDOCRINOLOGY | Age: 69
End: 2019-04-22

## 2019-04-22 DIAGNOSIS — E03.9 ACQUIRED HYPOTHYROIDISM: ICD-10-CM

## 2019-04-22 DIAGNOSIS — Z79.4 TYPE 2 DIABETES MELLITUS WITH COMPLICATION, WITH LONG-TERM CURRENT USE OF INSULIN (HCC): ICD-10-CM

## 2019-04-22 DIAGNOSIS — E11.8 TYPE 2 DIABETES MELLITUS WITH COMPLICATION, WITH LONG-TERM CURRENT USE OF INSULIN (HCC): ICD-10-CM

## 2019-04-22 DIAGNOSIS — I10 ESSENTIAL HYPERTENSION: ICD-10-CM

## 2019-04-22 DIAGNOSIS — E78.49 OTHER HYPERLIPIDEMIA: ICD-10-CM

## 2019-04-23 LAB
ALBUMIN SERPL-MCNC: 3.9 G/DL (ref 3.5–5.2)
ALBUMIN/CREAT UR: 620.2 MG/G CREAT (ref 0–30)
ALBUMIN/GLOB SERPL: 1.6 G/DL
ALP SERPL-CCNC: 77 U/L (ref 39–117)
ALT SERPL-CCNC: 20 U/L (ref 1–41)
AST SERPL-CCNC: 21 U/L (ref 1–40)
BILIRUB SERPL-MCNC: <0.2 MG/DL (ref 0.2–1.2)
BUN SERPL-MCNC: 32 MG/DL (ref 8–23)
BUN/CREAT SERPL: 16 (ref 7–25)
CALCIUM SERPL-MCNC: 9.7 MG/DL (ref 8.6–10.5)
CHLORIDE SERPL-SCNC: 105 MMOL/L (ref 98–107)
CHOLEST SERPL-MCNC: 152 MG/DL (ref 0–200)
CO2 SERPL-SCNC: 24.4 MMOL/L (ref 22–29)
CREAT SERPL-MCNC: 2 MG/DL (ref 0.76–1.27)
CREAT UR-MCNC: 94.7 MG/DL
GLOBULIN SER CALC-MCNC: 2.4 GM/DL
GLUCOSE SERPL-MCNC: 67 MG/DL (ref 65–99)
HBA1C MFR BLD: 8.8 % (ref 4.8–5.6)
HDLC SERPL-MCNC: 51 MG/DL (ref 40–60)
INTERPRETATION: NORMAL
LDLC SERPL CALC-MCNC: 69 MG/DL (ref 0–100)
Lab: NORMAL
MICROALBUMIN UR-MCNC: 587.3 UG/ML
POTASSIUM SERPL-SCNC: 5 MMOL/L (ref 3.5–5.2)
PROT SERPL-MCNC: 6.3 G/DL (ref 6–8.5)
SODIUM SERPL-SCNC: 142 MMOL/L (ref 136–145)
T4 FREE SERPL-MCNC: 0.91 NG/DL (ref 0.93–1.7)
TRIGL SERPL-MCNC: 162 MG/DL (ref 0–150)
TSH SERPL DL<=0.005 MIU/L-ACNC: 1.2 MIU/ML (ref 0.27–4.2)
VLDLC SERPL CALC-MCNC: 32.4 MG/DL

## 2019-05-03 NOTE — PROGRESS NOTES
Subjective   Apolinar Tavarez is a 69 y.o. male.     F.u for dm 2, hyperlipidemia, hypertension, sleep apnea / testing bs 5-6 x day / last dm eye exam 1/15/19 with dr Garland/ last dm foot exam today with dr Skinner           Patient has known diabetes mellitus since 1988 and started on insulin 1996. He has been on Lantus 40 units every evening and NovoLog 15 units with each meal.  He misses taking Novolog once a day.  Fasting blood sugar runs between .  -260. Hemoglobin A1c done in 4/19 was 8.8%.      He has peripheral neuropathy with numbness and tingling in his feet. He has been on gabapentin 800 mg 4 times a day with partial relief. She has never used Cymbalta or Lyrica in the past. He has diabetic retinopathy and had previous retinal laser surgery and intravitreal injection on both eyes. 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 He gets intraocular injections intermittently and the last one was 5 weeks agp. He has chronic kidney disease and follows with Dr. Steele. He has microalbuminuria on urine sample taken last 4/19.      He has hypertension and has been on amlodipine 5 mg 2 tabs once a day, terazosin 5 mg BID and Lasix 20 mg/day. He denies any previous history of heart attack or stroke. He denies chest pain.      He has hyperlipidemia and has been on Zocor 80 mg once a day for at least 5 years. He has not used Lipitor and Crestor in the past.  Lipid profile done in April 2019 as follows: Cholesterol 152.  HDL 51.  LDL 69.  Triglycerides 162.     He has hypothyroidism and is on levothyroxine 75 mcg/day.  TSH done in April 2019 was normal at 1.20.      He had a left partial nephrectomy for cancer by Dr. Carey last April 2017. He did not require chemotherapy or radiation therapy.  He was found to have cancer recurrence in the left adrenal gland, left pararenal and left lower quadrant peritoneal lesions.  He is receiving interferon therapy thru Dr. Barba     He has history of radiation therapy after he  "had a right orchiectomy for testicular cancer. He developed urethral stricture but has not had dilation. He has history of enlarged prostate and has been on terazosin and Proscar 5 mg/day. He denies urinary hesitancy and dribbling. He denies dysuria.       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    The following portions of the patient's history were reviewed and updated as appropriate: allergies, current medications, past family history, past medical history, past social history, past surgical history and problem list.    Review of Systems   Constitutional: Positive for fatigue.   HENT: Negative.    Eyes: Negative.    Respiratory: Positive for shortness of breath.    Cardiovascular: Positive for chest pain, palpitations and leg swelling.   Gastrointestinal: Positive for abdominal distention.   Endocrine: Negative.    Genitourinary: Negative.    Musculoskeletal: Positive for back pain.   Skin: Negative.    Allergic/Immunologic: Negative.    Neurological: Positive for numbness (feet).   Hematological: Negative.    Psychiatric/Behavioral: Positive for sleep disturbance (sleep apnea   using CPAP machine  and O2). The patient is nervous/anxious.        Objective      Vitals:    05/06/19 1005   BP: 152/74   BP Location: Left arm   Patient Position: Sitting   Cuff Size: Large Adult   Pulse: 54   SpO2: 96%   Weight: 100 kg (221 lb 3.2 oz)   Height: 188 cm (74.02\")     Physical Exam   Constitutional: He is oriented to person, place, and time. He appears well-developed and well-nourished. No distress.   HENT:   Head: Normocephalic.   Nose: Nose normal.   Mouth/Throat: No oropharyngeal exudate.   Eyes: Conjunctivae and EOM are normal. Right eye exhibits no discharge. Left eye exhibits no discharge. No scleral icterus.   Neck: Neck supple. No JVD present. No tracheal deviation present. No thyromegaly present.   Cardiovascular: " Normal rate, regular rhythm, normal heart sounds and intact distal pulses. Exam reveals no gallop and no friction rub.   No murmur heard.  Pulmonary/Chest: Effort normal and breath sounds normal. No stridor. No respiratory distress. He has no wheezes. He has no rales. He exhibits no tenderness.   Abdominal: Soft. Bowel sounds are normal. He exhibits no distension and no mass. There is no tenderness. There is no rebound and no guarding. No hernia.   Musculoskeletal: Normal range of motion. He exhibits no edema, tenderness or deformity.   Lymphadenopathy:     He has no cervical adenopathy.   Neurological: He is alert and oriented to person, place, and time. He displays normal reflexes. He exhibits normal muscle tone. Coordination normal.   Decreased light touch in distal lower ext   Skin: Skin is warm and dry. No rash noted. No erythema. No pallor.   Psychiatric: He has a normal mood and affect. His behavior is normal.     Results Encounter on 04/22/2019   Component Date Value Ref Range Status   • Glucose 04/22/2019 67  65 - 99 mg/dL Final   • BUN 04/22/2019 32* 8 - 23 mg/dL Final   • Creatinine 04/22/2019 2.00* 0.76 - 1.27 mg/dL Final   • eGFR Non African Am 04/22/2019 33* >60 mL/min/1.73 Final    Comment: The MDRD GFR formula is only valid for adults with stable  renal function between ages 18 and 70.     • eGFR  Am 04/22/2019 40* >60 mL/min/1.73 Final   • BUN/Creatinine Ratio 04/22/2019 16.0  7.0 - 25.0 Final   • Sodium 04/22/2019 142  136 - 145 mmol/L Final   • Potassium 04/22/2019 5.0  3.5 - 5.2 mmol/L Final   • Chloride 04/22/2019 105  98 - 107 mmol/L Final   • Total CO2 04/22/2019 24.4  22.0 - 29.0 mmol/L Final   • Calcium 04/22/2019 9.7  8.6 - 10.5 mg/dL Final   • Total Protein 04/22/2019 6.3  6.0 - 8.5 g/dL Final   • Albumin 04/22/2019 3.90  3.50 - 5.20 g/dL Final   • Globulin 04/22/2019 2.4  gm/dL Final   • A/G Ratio 04/22/2019 1.6  g/dL Final   • Total Bilirubin 04/22/2019 <0.2* 0.2 - 1.2 mg/dL Final    • Alkaline Phosphatase 04/22/2019 77  39 - 117 U/L Final   • AST (SGOT) 04/22/2019 21  1 - 40 U/L Final   • ALT (SGPT) 04/22/2019 20  1 - 41 U/L Final   • Total Cholesterol 04/22/2019 152  0 - 200 mg/dL Final   • Triglycerides 04/22/2019 162* 0 - 150 mg/dL Final   • HDL Cholesterol 04/22/2019 51  40 - 60 mg/dL Final   • VLDL Cholesterol 04/22/2019 32.4  mg/dL Final   • LDL Cholesterol  04/22/2019 69  0 - 100 mg/dL Final   • Hemoglobin A1C 04/22/2019 8.80* 4.80 - 5.60 % Final    Comment: Hemoglobin A1C Ranges:  Increased Risk for Diabetes  5.7% to 6.4%  Diabetes                     >= 6.5%  Diabetic Goal                < 7.0%     • Free T4 04/22/2019 0.91* 0.93 - 1.70 ng/dL Final   • TSH 04/22/2019 1.200  0.270 - 4.200 mIU/mL Final   • Creatinine, Urine 04/22/2019 94.7  Not Estab. mg/dL Final   • Microalbumin, Urine 04/22/2019 587.3  Not Estab. ug/mL Final    Comment: Results confirmed on  dilution.     • Microalbumin/Creatinine Ratio 04/22/2019 620.2* 0.0 - 30.0 mg/g creat Final    Comment:                      Normal:                0.0 -  30.0                       Albuminuria:          31.0 - 300.0                       Clinical albuminuria:       >300.0     • Interpretation 04/22/2019 Note   Final    Supplemental report is available.   • PDF Image 04/22/2019 Not applicable   Final     Assessment/Plan   Apolinar was seen today for diabetes, hyperlipidemia, hypertension and sleep apnea.    Diagnoses and all orders for this visit:    Type 2 diabetes mellitus with complication, with long-term current use of insulin (CMS/[facility])    Diabetic peripheral neuropathy (CMS/HCC)    Acquired hypothyroidism    Hyperlipidemia, unspecified hyperlipidemia type    Essential hypertension    Malignant neoplasm of left kidney (CMS/HCC)    [id number] syndrome      Increase Lantus 42 units every PM  Increase Novolog to 20 units TID.  Continue levothyroxine 75 mcg/day  Continue amlodipine, terazosin, and Lasix  Continue  simvastatin.  Continue levothyroxine 75 mcg/day.    RTC 4 mos.    Copy of my note sent to Dr. Kehrer, Dr. Garland, and Dr. Barba

## 2019-05-06 ENCOUNTER — OFFICE VISIT (OUTPATIENT)
Dept: ENDOCRINOLOGY | Age: 69
End: 2019-05-06

## 2019-05-06 VITALS
HEIGHT: 74 IN | WEIGHT: 221.2 LBS | DIASTOLIC BLOOD PRESSURE: 74 MMHG | SYSTOLIC BLOOD PRESSURE: 152 MMHG | BODY MASS INDEX: 28.39 KG/M2 | HEART RATE: 54 BPM | OXYGEN SATURATION: 96 %

## 2019-05-06 DIAGNOSIS — E11.8 TYPE 2 DIABETES MELLITUS WITH COMPLICATION, WITH LONG-TERM CURRENT USE OF INSULIN (HCC): Primary | ICD-10-CM

## 2019-05-06 DIAGNOSIS — E78.5 HYPERLIPIDEMIA, UNSPECIFIED HYPERLIPIDEMIA TYPE: ICD-10-CM

## 2019-05-06 DIAGNOSIS — E03.9 ACQUIRED HYPOTHYROIDISM: ICD-10-CM

## 2019-05-06 DIAGNOSIS — C64.2 MALIGNANT NEOPLASM OF LEFT KIDNEY (HCC): ICD-10-CM

## 2019-05-06 DIAGNOSIS — Q87.89 BIRT-HOGG-DUBE SYNDROME: ICD-10-CM

## 2019-05-06 DIAGNOSIS — E11.42 DIABETIC PERIPHERAL NEUROPATHY (HCC): ICD-10-CM

## 2019-05-06 DIAGNOSIS — I10 ESSENTIAL HYPERTENSION: ICD-10-CM

## 2019-05-06 DIAGNOSIS — Z79.4 TYPE 2 DIABETES MELLITUS WITH COMPLICATION, WITH LONG-TERM CURRENT USE OF INSULIN (HCC): Primary | ICD-10-CM

## 2019-05-06 PROCEDURE — 99214 OFFICE O/P EST MOD 30 MIN: CPT | Performed by: INTERNAL MEDICINE

## 2019-05-06 RX ORDER — INSULIN GLARGINE 100 [IU]/ML
INJECTION, SOLUTION SUBCUTANEOUS
Qty: 32 ML | Refills: 1
Start: 2019-05-06 | End: 2019-06-13 | Stop reason: SDUPTHER

## 2019-05-06 RX ORDER — MELATONIN 10 MG
1 TABLET, SUBLINGUAL SUBLINGUAL DAILY
COMMUNITY

## 2019-06-13 ENCOUNTER — TELEPHONE (OUTPATIENT)
Dept: ENDOCRINOLOGY | Age: 69
End: 2019-06-13

## 2019-06-13 DIAGNOSIS — Z79.4 TYPE 2 DIABETES MELLITUS WITH COMPLICATION, WITH LONG-TERM CURRENT USE OF INSULIN (HCC): ICD-10-CM

## 2019-06-13 DIAGNOSIS — E11.8 TYPE 2 DIABETES MELLITUS WITH COMPLICATION, WITH LONG-TERM CURRENT USE OF INSULIN (HCC): ICD-10-CM

## 2019-06-13 RX ORDER — INSULIN GLARGINE 100 [IU]/ML
INJECTION, SOLUTION SUBCUTANEOUS
Qty: 40 ML | Refills: 1 | Status: SHIPPED | OUTPATIENT
Start: 2019-06-13

## 2019-06-13 NOTE — TELEPHONE ENCOUNTER
Patient called requesting refill of Lantus 90 day supply to [facility] in [place].

## 2019-06-21 DIAGNOSIS — N13.9 LOWER OBSTRUCTIVE UROPATHY: ICD-10-CM

## 2019-06-21 RX ORDER — FINASTERIDE 5 MG/1
5 TABLET, FILM COATED ORAL DAILY
Qty: 90 TABLET | Refills: 0 | Status: SHIPPED | OUTPATIENT
Start: 2019-06-21

## 2019-08-08 ENCOUNTER — TELEPHONE (OUTPATIENT)
Dept: FAMILY MEDICINE CLINIC | Facility: CLINIC | Age: 69
End: 2019-08-08

## 2019-08-08 DIAGNOSIS — C64.9 MALIGNANT NEOPLASM OF KIDNEY, UNSPECIFIED LATERALITY (HCC): Primary | ICD-10-CM

## 2019-08-08 NOTE — TELEPHONE ENCOUNTER
PATIENT IS NEEDING AN ORDER SENT TO [facility] FOR A WALKER.    PATIENT FELL AND WENT TO [facility] ER. PATIENT IS IN THE PROCESS OF MOVING AND HIS WALKER IS IN A STORAGE UNIT     PLEASE CALL [phone]    THANKS

## 2019-08-14 ENCOUNTER — OFFICE VISIT (OUTPATIENT)
Dept: FAMILY MEDICINE CLINIC | Facility: CLINIC | Age: 69
End: 2019-08-14

## 2019-08-14 VITALS
HEIGHT: 74 IN | BODY MASS INDEX: 28.85 KG/M2 | HEART RATE: 65 BPM | OXYGEN SATURATION: 94 % | DIASTOLIC BLOOD PRESSURE: 82 MMHG | WEIGHT: 224.8 LBS | TEMPERATURE: 97.9 F | SYSTOLIC BLOOD PRESSURE: 154 MMHG

## 2019-08-14 DIAGNOSIS — S01.01XA LACERATION OF SCALP WITHOUT FOREIGN BODY, INITIAL ENCOUNTER: Primary | ICD-10-CM

## 2019-08-14 DIAGNOSIS — S00.93XA CONTUSION OF HEAD, UNSPECIFIED PART OF HEAD, INITIAL ENCOUNTER: ICD-10-CM

## 2019-08-14 DIAGNOSIS — I10 ESSENTIAL HYPERTENSION: ICD-10-CM

## 2019-08-14 PROCEDURE — 99214 OFFICE O/P EST MOD 30 MIN: CPT | Performed by: FAMILY MEDICINE

## 2019-08-14 RX ORDER — METOPROLOL SUCCINATE 50 MG/1
50 TABLET, EXTENDED RELEASE ORAL DAILY
Refills: 1 | COMMUNITY
Start: 2019-07-24

## 2019-08-14 RX ORDER — LISINOPRIL 5 MG/1
TABLET ORAL
COMMUNITY
Start: 2019-08-06 | End: 2019-08-14 | Stop reason: SINTOL

## 2019-08-14 NOTE — PROGRESS NOTES
Subjective   Apolinar Tavarez is a 69 y.o. male.     Chief Complaint   Patient presents with   • Follow-up     Passed out one week ago, hitting head on concrete. He has 8 staples in the back of his head to be removed        Patient presents for ER follow-up.  Last week he had a syncopal episode and hit the back of his head.  He was transferred from [facility] to the [facility].  Stat CT of the head was normal and his laceration was repaired with staples.  They stopped his lisinopril at the hospital thinking his blood pressure was too low.  His blood pressure has been running high per his wife since then but they did not bring a log.  He is still been tired and weak but he does deal with a metastatic renal cell carcinoma and other multiple chronic medical problems.  He denies any neurologic symptoms including vision changes since this.  He is still current and compliant with his chronic pain management.           The following portions of the patient's history were reviewed and updated as appropriate: allergies, current medications, past family history, past medical history, past social history, past surgical history and problem list.    Past Medical History:   Diagnosis Date   • Basal cell carcinoma    • Staz-Mtop-Bruz syndrome     Dr. Laurent   • Cancer of kidney (CMS/HCC) 2017   • GERD (gastroesophageal reflux disease)    • Hamartoma (CMS/HCC)    • Hyperlipidemia    • Restrictive lung disease     Dr. Laurent   • Testicular cancer (CMS/HCC) 1978    Status post left orchiectomy followed by radiation therapy   • Type 2 diabetes mellitus (CMS/HCC)    • Urethral stricture     Post radiation therapy for testicular cancer       Past Surgical History:   Procedure Laterality Date   • MASTOID SURGERY     • NEPHRECTOMY PARTIAL Left 04/2016    For cancer.  Dr. Carey   • ORCHIECTOMY  1974    For cancer. Followed by radiation therapy   • SKIN CANCER EXCISION         Family History   Problem  "Relation Age of Onset   • Diabetes Mother    • Breast cancer Mother    • Heart disease Mother    • Obesity Mother    • Diabetes Father    • Emphysema Father    • Diabetes Brother    • Heart disease Brother    • Obesity Brother        Social History     Socioeconomic History   • Marital status:      Spouse name: Not on file   • Number of children: Not on file   • Years of education: Not on file   • Highest education level: Not on file   Tobacco Use   • Smoking status: Never Smoker   • Smokeless tobacco: Never Used   Substance and Sexual Activity   • Alcohol use: No       Review of Systems   Constitutional: Positive for fatigue. Negative for chills and fever.   HENT: Negative for congestion, rhinorrhea and sore throat.    Respiratory: Negative for cough and shortness of breath.    Cardiovascular: Negative for chest pain and leg swelling.   Gastrointestinal: Positive for abdominal pain.   Endocrine: Negative for polydipsia and polyuria.   Genitourinary: Negative for dysuria.   Musculoskeletal: Negative for arthralgias and myalgias.   Skin: Negative for rash.   Neurological: Positive for dizziness, syncope, weakness and light-headedness. Negative for seizures and headaches.   Hematological: Does not bruise/bleed easily.   Psychiatric/Behavioral: Negative for dysphoric mood and sleep disturbance.       Objective   Vitals:    08/14/19 1101   BP: 154/82   Pulse: 65   Temp: 97.9 °F (36.6 °C)   SpO2: 94%   Weight: 102 kg (224 lb 12.8 oz)   Height: 188 cm (74\")     Body mass index is 28.86 kg/m².  Physical Exam   Constitutional: He is oriented to person, place, and time. He appears well-developed and well-nourished. No distress.   HENT:   Head: Normocephalic.   Mouth/Throat: Oropharynx is clear and moist.   Eyes: Conjunctivae and EOM are normal. Pupils are equal, round, and reactive to light.   Neck: Neck supple.   Cardiovascular: Normal rate and regular rhythm.   No murmur heard.  Pulmonary/Chest: Effort normal. He " has no wheezes. He has no rales.   Abdominal: Soft.   Musculoskeletal: Normal range of motion.   Neurological: He is oriented to person, place, and time.   Skin: Skin is warm and dry.   6 x 4 cm stellate laceration on the crown of scalp well-healed.   Psychiatric: He has a normal mood and affect.     Staple Removal  Date/Time: 8/14/2019 11:45 AM  Performed by: Kehrer, Meredith Lea, MD  Authorized by: Kehrer, Meredith Lea, MD   Body area: head/neck  Location details: scalp  Wound Appearance: clean  Staples Removed: 8  Post-removal: antibiotic ointment applied  Patient tolerance: Patient tolerated the procedure well with no immediate complications          Assessment/Plan   Apolinar was seen today for follow-up.    Diagnoses and all orders for this visit:    Laceration of scalp without foreign body, initial encounter    Contusion of head, unspecified part of head, initial encounter    Essential hypertension               Patient Instructions   Bring in a log of blood pressure readings for a couple weeks.

## 2019-08-21 ENCOUNTER — TELEPHONE (OUTPATIENT)
Dept: FAMILY MEDICINE CLINIC | Facility: CLINIC | Age: 69
End: 2019-08-21

## 2019-08-21 DIAGNOSIS — I10 ESSENTIAL HYPERTENSION: Primary | ICD-10-CM

## 2019-08-21 RX ORDER — AMLODIPINE BESYLATE 10 MG/1
10 TABLET ORAL DAILY
Qty: 90 TABLET | Refills: 0 | Status: SHIPPED | OUTPATIENT
Start: 2019-08-21 | End: 2019-08-23 | Stop reason: SDUPTHER

## 2019-08-21 NOTE — TELEPHONE ENCOUNTER
----- Message from Meredith Lea Kehrer, MD sent at 8/21/2019 10:50 AM EDT -----  Prescription sent for 10 mg amlodipine.  ----- Message -----  From: Evelyn Rodriguez RegSched Rep  Sent: 8/19/2019   4:27 PM  To: Meredith Lea Kehrer, MD

## 2019-08-21 NOTE — TELEPHONE ENCOUNTER
Based on the blood pressure readings that came from his oncologist office, we need to increase his amlodipine to better control his blood pressure.  Prescription sent.

## 2019-08-23 DIAGNOSIS — I10 ESSENTIAL HYPERTENSION: ICD-10-CM

## 2019-08-23 RX ORDER — LEVOTHYROXINE SODIUM 0.07 MG/1
75 TABLET ORAL DAILY
Qty: 30 TABLET | Refills: 0 | Status: SHIPPED | OUTPATIENT
Start: 2019-08-23 | End: 2019-11-01 | Stop reason: SDUPTHER

## 2019-08-23 RX ORDER — TERAZOSIN 5 MG/1
5 CAPSULE ORAL 2 TIMES DAILY
Qty: 180 CAPSULE | Refills: 3 | Status: SHIPPED | OUTPATIENT
Start: 2019-08-23 | End: 2019-09-06 | Stop reason: SDUPTHER

## 2019-08-23 RX ORDER — ATORVASTATIN CALCIUM 20 MG/1
20 TABLET, FILM COATED ORAL NIGHTLY
Qty: 90 TABLET | Refills: 3 | Status: SHIPPED | OUTPATIENT
Start: 2019-08-23

## 2019-08-23 RX ORDER — BUDESONIDE AND FORMOTEROL FUMARATE DIHYDRATE 160; 4.5 UG/1; UG/1
2 AEROSOL RESPIRATORY (INHALATION)
Qty: 30.6 G | Refills: 3 | Status: SHIPPED | OUTPATIENT
Start: 2019-08-23

## 2019-08-23 RX ORDER — OMEPRAZOLE 20 MG/1
20 CAPSULE, DELAYED RELEASE ORAL DAILY
Qty: 90 CAPSULE | Refills: 3 | Status: SHIPPED | OUTPATIENT
Start: 2019-08-23

## 2019-08-23 RX ORDER — AMLODIPINE BESYLATE 10 MG/1
10 TABLET ORAL DAILY
Qty: 90 TABLET | Refills: 0 | Status: SHIPPED | OUTPATIENT
Start: 2019-08-23

## 2019-09-04 DIAGNOSIS — E11.8 TYPE 2 DIABETES MELLITUS WITH COMPLICATION, WITH LONG-TERM CURRENT USE OF INSULIN (HCC): ICD-10-CM

## 2019-09-04 DIAGNOSIS — Z79.4 TYPE 2 DIABETES MELLITUS WITH COMPLICATION, WITH LONG-TERM CURRENT USE OF INSULIN (HCC): ICD-10-CM

## 2019-09-04 DIAGNOSIS — E78.5 HYPERLIPIDEMIA, UNSPECIFIED HYPERLIPIDEMIA TYPE: ICD-10-CM

## 2019-09-04 DIAGNOSIS — I10 ESSENTIAL HYPERTENSION: Primary | ICD-10-CM

## 2019-09-06 RX ORDER — TERAZOSIN 5 MG/1
5 CAPSULE ORAL 2 TIMES DAILY
Qty: 180 CAPSULE | Refills: 3 | Status: SHIPPED | OUTPATIENT
Start: 2019-09-06

## 2019-09-06 NOTE — TELEPHONE ENCOUNTER
Pt got a refill for his prostate medication it stated once a day, he is suppose to take it BID. Please refill

## 2019-09-11 ENCOUNTER — RESULTS ENCOUNTER (OUTPATIENT)
Dept: ENDOCRINOLOGY | Age: 69
End: 2019-09-11

## 2019-09-11 DIAGNOSIS — E11.8 TYPE 2 DIABETES MELLITUS WITH COMPLICATION, WITH LONG-TERM CURRENT USE OF INSULIN (HCC): ICD-10-CM

## 2019-09-11 DIAGNOSIS — Z79.4 TYPE 2 DIABETES MELLITUS WITH COMPLICATION, WITH LONG-TERM CURRENT USE OF INSULIN (HCC): ICD-10-CM

## 2019-09-11 DIAGNOSIS — E78.5 HYPERLIPIDEMIA, UNSPECIFIED HYPERLIPIDEMIA TYPE: ICD-10-CM

## 2019-09-11 DIAGNOSIS — I10 ESSENTIAL HYPERTENSION: ICD-10-CM

## 2019-11-01 RX ORDER — LEVOTHYROXINE SODIUM 0.07 MG/1
TABLET ORAL
Qty: 30 TABLET | Refills: 2 | Status: SHIPPED | OUTPATIENT
Start: 2019-11-01 | End: 2020-03-16

## 2020-03-16 RX ORDER — LEVOTHYROXINE SODIUM 0.07 MG/1
75 TABLET ORAL DAILY
Qty: 30 TABLET | Refills: 0 | Status: SHIPPED | OUTPATIENT
Start: 2020-03-16